# Patient Record
Sex: FEMALE | Race: WHITE
[De-identification: names, ages, dates, MRNs, and addresses within clinical notes are randomized per-mention and may not be internally consistent; named-entity substitution may affect disease eponyms.]

---

## 2017-03-18 ENCOUNTER — HOSPITAL ENCOUNTER (EMERGENCY)
Dept: HOSPITAL 47 - EC | Age: 67
Discharge: HOME | End: 2017-03-18
Payer: MEDICARE

## 2017-03-18 VITALS — HEART RATE: 77 BPM | TEMPERATURE: 98.7 F | DIASTOLIC BLOOD PRESSURE: 97 MMHG | SYSTOLIC BLOOD PRESSURE: 168 MMHG

## 2017-03-18 VITALS — RESPIRATION RATE: 18 BRPM

## 2017-03-18 DIAGNOSIS — Z91.048: ICD-10-CM

## 2017-03-18 DIAGNOSIS — F17.200: ICD-10-CM

## 2017-03-18 DIAGNOSIS — R55: Primary | ICD-10-CM

## 2017-03-18 DIAGNOSIS — Z79.899: ICD-10-CM

## 2017-03-18 DIAGNOSIS — E27.1: ICD-10-CM

## 2017-03-18 DIAGNOSIS — R22.0: ICD-10-CM

## 2017-03-18 DIAGNOSIS — T88.6XXA: ICD-10-CM

## 2017-03-18 DIAGNOSIS — E07.9: ICD-10-CM

## 2017-03-18 DIAGNOSIS — I10: ICD-10-CM

## 2017-03-18 DIAGNOSIS — T36.1X5A: ICD-10-CM

## 2017-03-18 DIAGNOSIS — R21: ICD-10-CM

## 2017-03-18 LAB
ALP SERPL-CCNC: 59 U/L (ref 38–126)
ALT SERPL-CCNC: 22 U/L (ref 9–52)
ANION GAP SERPL CALC-SCNC: 15 MMOL/L
APTT BLD: 21.1 SEC (ref 22–30)
AST SERPL-CCNC: 22 U/L (ref 14–36)
BASOPHILS # BLD AUTO: 0 K/UL (ref 0–0.2)
BASOPHILS NFR BLD AUTO: 0 %
BUN SERPL-SCNC: 27 MG/DL (ref 7–17)
CALCIUM SPEC-MCNC: 9.9 MG/DL (ref 8.4–10.2)
CH: 30.9
CHCM: 33
CHLORIDE SERPL-SCNC: 101 MMOL/L (ref 98–107)
CK SERPL-CCNC: 32 U/L (ref 30–135)
CO2 SERPL-SCNC: 22 MMOL/L (ref 22–30)
EOSINOPHIL # BLD AUTO: 0.1 K/UL (ref 0–0.7)
EOSINOPHIL NFR BLD AUTO: 1 %
ERYTHROCYTE [DISTWIDTH] IN BLOOD BY AUTOMATED COUNT: 6.04 M/UL (ref 3.8–5.4)
ERYTHROCYTE [DISTWIDTH] IN BLOOD: 13.5 % (ref 11.5–15.5)
GLUCOSE SERPL-MCNC: 226 MG/DL (ref 74–99)
HCT VFR BLD AUTO: 56.8 % (ref 34–46)
HDW: 2.39
HGB BLD-MCNC: 18.6 GM/DL (ref 11.4–16)
INR PPP: 1.1 (ref ?–1.1)
LUC NFR BLD AUTO: 3 %
LYMPHOCYTES # SPEC AUTO: 1.8 K/UL (ref 1–4.8)
LYMPHOCYTES NFR SPEC AUTO: 16 %
MAGNESIUM SPEC-SCNC: 1.8 MG/DL (ref 1.6–2.3)
MCH RBC QN AUTO: 30.9 PG (ref 25–35)
MCHC RBC AUTO-ENTMCNC: 32.8 G/DL (ref 31–37)
MCV RBC AUTO: 94.1 FL (ref 80–100)
MONOCYTES # BLD AUTO: 0.3 K/UL (ref 0–1)
MONOCYTES NFR BLD AUTO: 3 %
NEUTROPHILS # BLD AUTO: 8.6 K/UL (ref 1.3–7.7)
NEUTROPHILS NFR BLD AUTO: 78 %
NON-AFRICAN AMERICAN GFR(MDRD): 42
PHOSPHATE SERPL-MCNC: 5.3 MG/DL (ref 2.5–4.5)
POTASSIUM SERPL-SCNC: 4.2 MMOL/L (ref 3.5–5.1)
PROT SERPL-MCNC: 6.8 G/DL (ref 6.3–8.2)
PT BLD: 11.1 SEC (ref 9–12)
SODIUM SERPL-SCNC: 138 MMOL/L (ref 137–145)
TROPONIN I SERPL-MCNC: 0.01 NG/ML (ref 0–0.03)
WBC # BLD AUTO: 0.28 10*3/UL
WBC # BLD AUTO: 11.1 K/UL (ref 3.8–10.6)
WBC (PEROX): 11.19

## 2017-03-18 PROCEDURE — 82550 ASSAY OF CK (CPK): CPT

## 2017-03-18 PROCEDURE — 85379 FIBRIN DEGRADATION QUANT: CPT

## 2017-03-18 PROCEDURE — 96374 THER/PROPH/DIAG INJ IV PUSH: CPT

## 2017-03-18 PROCEDURE — 85025 COMPLETE CBC W/AUTO DIFF WBC: CPT

## 2017-03-18 PROCEDURE — 85730 THROMBOPLASTIN TIME PARTIAL: CPT

## 2017-03-18 PROCEDURE — 96375 TX/PRO/DX INJ NEW DRUG ADDON: CPT

## 2017-03-18 PROCEDURE — 83735 ASSAY OF MAGNESIUM: CPT

## 2017-03-18 PROCEDURE — 71275 CT ANGIOGRAPHY CHEST: CPT

## 2017-03-18 PROCEDURE — 96361 HYDRATE IV INFUSION ADD-ON: CPT

## 2017-03-18 PROCEDURE — 84100 ASSAY OF PHOSPHORUS: CPT

## 2017-03-18 PROCEDURE — 83605 ASSAY OF LACTIC ACID: CPT

## 2017-03-18 PROCEDURE — 80053 COMPREHEN METABOLIC PANEL: CPT

## 2017-03-18 PROCEDURE — 84484 ASSAY OF TROPONIN QUANT: CPT

## 2017-03-18 PROCEDURE — 99291 CRITICAL CARE FIRST HOUR: CPT

## 2017-03-18 PROCEDURE — 82553 CREATINE MB FRACTION: CPT

## 2017-03-18 PROCEDURE — 96372 THER/PROPH/DIAG INJ SC/IM: CPT

## 2017-03-18 PROCEDURE — 85610 PROTHROMBIN TIME: CPT

## 2017-03-18 PROCEDURE — 93005 ELECTROCARDIOGRAM TRACING: CPT

## 2017-03-18 PROCEDURE — 74177 CT ABD & PELVIS W/CONTRAST: CPT

## 2017-03-18 PROCEDURE — 36415 COLL VENOUS BLD VENIPUNCTURE: CPT

## 2017-03-18 NOTE — ED
General Adult HPI





- General


Chief complaint: Allergic Reaction


Stated complaint: UNRESPONSIVE


Time Seen by Provider: 03/18/17 16:34


Source: EMS


Mode of arrival: EMS


Limitations: no limitations





- History of Present Illness


Initial comments: 





This is a 67-year-old female who ER for evaluation today.  This patient comes 

in here for eval initial syncopal event.  Patient has no history of syncope.  

Patient states she doesn't feel like she had a urinary tract infection and 

began taking an antibiotic that was old that she prescribed or recommended for 

herself.  Patient denies any other chest pain fever nausea this time.  EMS also 

replaced over the patient was passed out when he got there.  Patient never lost 

pulse.  She does present today with itching hives and rash all over body 

anterior chest.  Denies stridor denies tongue swelling denies shortness of 

breath.  States her lips are swollen as well





- Related Data


 Home Medications











 Medication  Instructions  Recorded  Confirmed


 


Hydrocortisone [Cortef] 10 mg PO HS 03/10/15 03/18/17


 


Hydrocortisone [Cortef] 20 mg PO QAM 03/10/15 03/18/17


 


Atenolol [Tenormin] 25 mg PO QAM 03/18/17 03/18/17


 


Atorvastatin Calcium [Lipitor] 10 mg PO HS 03/18/17 03/18/17


 


Levothyroxine Sodium [Synthroid] 75 mcg PO DAILY 03/18/17 03/18/17


 


Lisinopril [Prinivil] 20 mg PO HS 03/18/17 03/18/17


 


Solu-Cortef 100 mg IM ONCE 03/18/17 03/18/17








 Previous Rx's











 Medication  Instructions  Recorded


 


Famotidine [Pepcid] 20 mg PO BID #15 tablet 03/18/17


 


Hydrocortisone [Cortef] 20 mg PO BID #15 tablet 03/18/17


 


hydrOXYzine HCL [Atarax] 25 mg PO QID PRN #30 tab 03/18/17











 Allergies











Allergy/AdvReac Type Severity Reaction Status Date / Time


 


adhesive Allergy  Rash/Hives Verified 03/18/17 16:44














Review of Systems


ROS Statement: 


Those systems with pertinent positive or pertinent negative responses have been 

documented in the HPI.





ROS Other: All systems not noted in ROS Statement are negative.





Past Medical History


Past Medical History: Hypertension, Thyroid Disorder


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


History of Any Multi-Drug Resistant Organisms: None Reported


Additional Past Surgical History / Comment(s): dental implant  thyroid


Past Psychological History: No Psychological Hx Reported


Smoking Status: Light tobacco smoker


Past Alcohol Use History: None Reported


Past Drug Use History: None Reported





General Exam





- General Exam Comments


Initial Comments: 





Rash across his entire chest and bilateral upper arms, mild lip swelling


Limitations: no limitations


General appearance: alert, in no apparent distress, anxious


Head exam: Present: atraumatic, normocephalic, normal inspection


Eye exam: Present: normal appearance, PERRL, EOMI.  Absent: scleral icterus, 

conjunctival injection, periorbital swelling


ENT exam: Present: normal exam, mucous membranes moist


Neck exam: Present: normal inspection.  Absent: tenderness, meningismus, 

lymphadenopathy


Respiratory exam: Present: normal lung sounds bilaterally.  Absent: respiratory 

distress, wheezes, rales, rhonchi, stridor


Cardiovascular Exam: Present: regular rate, normal rhythm, normal heart sounds.

  Absent: systolic murmur, diastolic murmur, rubs, gallop, clicks


GI/Abdominal exam: Present: soft, normal bowel sounds.  Absent: distended, 

tenderness, guarding, rebound, rigid


Extremities exam: Present: normal inspection, full ROM, normal capillary 

refill.  Absent: tenderness, pedal edema, joint swelling, calf tenderness


Back exam: Present: normal inspection


Neurological exam: Present: alert, oriented X3, CN II-XII intact


Psychiatric exam: Present: normal affect, normal mood


Skin exam: Present: warm, dry, intact, normal color.  Absent: rash





Course


 Vital Signs











  03/18/17 03/18/17 03/18/17





  16:33 16:58 17:09


 


Temperature 97.5 F L  


 


Pulse Rate 82 68 82


 


Respiratory 18 18 18





Rate   


 


Blood Pressure  80/57 128/63


 


O2 Sat by Pulse 100 100 100





Oximetry   














  03/18/17 03/18/17 03/18/17





  17:19 18:49 19:52


 


Temperature   98.7 F


 


Pulse Rate  71 77


 


Respiratory 18 18 18





Rate   


 


Blood Pressure  134/67 168/97


 


O2 Sat by Pulse  100 100





Oximetry   














- Reevaluation(s)


Reevaluation #1: 





03/18/17 20:06


Patient's symptoms improved rapidly with injection of epinephrine and steroids 

here in the emergency room.


Reevaluation #2: 





03/18/17 20:06


Patient and family spoken with greater than 15 minutes regarding medication 

taking dosages and ALLERGIC reaction, all questions are answered





EKG Findings





- EKG Comments:


EKG Findings:: EKG shows normal sinus rhythm rate of 80, , QRS 84, 





Medical Decision Making





- Medical Decision Making





67-year-old here for evaluation of syncopal event, patient had ALLERGIC 

reaction syncope diffuse body rash lip swelling after taking an antibiotic 

earlier in the day, Keflex.  Patient advised never to take any cephalosporin 

again, she does agreeable and understands.  Patient given epinephrine and 

steroids in the emergency room, increased steroid dose at home twice a day.  

Secondary to history of [place]'s disease.  Currently patient has no complaints

, rash and symptoms have resolved, she feels well and can be discharged home.





- Lab Data


Result diagrams: 


 03/18/17 16:54





 03/18/17 16:54


 Lab Results











  03/18/17 03/18/17 03/18/17 Range/Units





  16:54 16:54 16:54 


 


WBC   11.1 H   (3.8-10.6)  k/uL


 


RBC   6.04 H   (3.80-5.40)  m/uL


 


Hgb   18.6 H   (11.4-16.0)  gm/dL


 


Hct   56.8 H   (34.0-46.0)  %


 


MCV   94.1   (80.0-100.0)  fL


 


MCH   30.9   (25.0-35.0)  pg


 


MCHC   32.8   (31.0-37.0)  g/dL


 


RDW   13.5   (11.5-15.5)  %


 


Plt Count   238   (150-450)  k/uL


 


Neutrophils %   78   %


 


Lymphocytes %   16   %


 


Monocytes %   3   %


 


Eosinophils %   1   %


 


Basophils %   0   %


 


Neutrophils #   8.6 H   (1.3-7.7)  k/uL


 


Lymphocytes #   1.8   (1.0-4.8)  k/uL


 


Monocytes #   0.3   (0-1.0)  k/uL


 


Eosinophils #   0.1   (0-0.7)  k/uL


 


Basophils #   0.0   (0-0.2)  k/uL


 


PT     (9.0-12.0)  sec


 


INR     (<1.1)  


 


APTT     (22.0-30.0)  sec


 


D-Dimer     (<0.60)  mg/L FEU


 


Sodium    138  (137-145)  mmol/L


 


Potassium    4.2  (3.5-5.1)  mmol/L


 


Chloride    101  ()  mmol/L


 


Carbon Dioxide    22  (22-30)  mmol/L


 


Anion Gap    15  mmol/L


 


BUN    27 H  (7-17)  mg/dL


 


Creatinine    1.28 H  (0.52-1.04)  mg/dL


 


Est GFR (MDRD) Af Amer    51  (>60 ml/min/1.73 sqM)  


 


Est GFR (MDRD) Non-Af    42  (>60 ml/min/1.73 sqM)  


 


Glucose    226 H  (74-99)  mg/dL


 


Plasma Lactic Acid Sylvester     (0.7-2.0)  mmol/L


 


Calcium    9.9  (8.4-10.2)  mg/dL


 


Phosphorus    5.3 H  (2.5-4.5)  mg/dL


 


Magnesium    1.8  (1.6-2.3)  mg/dL


 


Total Bilirubin    0.8  (0.2-1.3)  mg/dL


 


AST    22  (14-36)  U/L


 


ALT    22  (9-52)  U/L


 


Alkaline Phosphatase    59  ()  U/L


 


Total Creatine Kinase  32    ()  U/L


 


CK-MB (CK-2)  0.6    (0.0-2.4)  ng/mL


 


CK-MB (CK-2) Rel Index  1.9    


 


Troponin I  0.014    (0.000-0.034)  ng/mL


 


Total Protein    6.8  (6.3-8.2)  g/dL


 


Albumin    3.8  (3.5-5.0)  g/dL














  03/18/17 03/18/17 Range/Units





  16:54 16:54 


 


WBC    (3.8-10.6)  k/uL


 


RBC    (3.80-5.40)  m/uL


 


Hgb    (11.4-16.0)  gm/dL


 


Hct    (34.0-46.0)  %


 


MCV    (80.0-100.0)  fL


 


MCH    (25.0-35.0)  pg


 


MCHC    (31.0-37.0)  g/dL


 


RDW    (11.5-15.5)  %


 


Plt Count    (150-450)  k/uL


 


Neutrophils %    %


 


Lymphocytes %    %


 


Monocytes %    %


 


Eosinophils %    %


 


Basophils %    %


 


Neutrophils #    (1.3-7.7)  k/uL


 


Lymphocytes #    (1.0-4.8)  k/uL


 


Monocytes #    (0-1.0)  k/uL


 


Eosinophils #    (0-0.7)  k/uL


 


Basophils #    (0-0.2)  k/uL


 


PT  11.1   (9.0-12.0)  sec


 


INR  1.1   (<1.1)  


 


APTT  21.1 L   (22.0-30.0)  sec


 


D-Dimer  11.71 H   (<0.60)  mg/L FEU


 


Sodium    (137-145)  mmol/L


 


Potassium    (3.5-5.1)  mmol/L


 


Chloride    ()  mmol/L


 


Carbon Dioxide    (22-30)  mmol/L


 


Anion Gap    mmol/L


 


BUN    (7-17)  mg/dL


 


Creatinine    (0.52-1.04)  mg/dL


 


Est GFR (MDRD) Af Amer    (>60 ml/min/1.73 sqM)  


 


Est GFR (MDRD) Non-Af    (>60 ml/min/1.73 sqM)  


 


Glucose    (74-99)  mg/dL


 


Plasma Lactic Acid Sylvester   3.8 H*  (0.7-2.0)  mmol/L


 


Calcium    (8.4-10.2)  mg/dL


 


Phosphorus    (2.5-4.5)  mg/dL


 


Magnesium    (1.6-2.3)  mg/dL


 


Total Bilirubin    (0.2-1.3)  mg/dL


 


AST    (14-36)  U/L


 


ALT    (9-52)  U/L


 


Alkaline Phosphatase    ()  U/L


 


Total Creatine Kinase    ()  U/L


 


CK-MB (CK-2)    (0.0-2.4)  ng/mL


 


CK-MB (CK-2) Rel Index    


 


Troponin I    (0.000-0.034)  ng/mL


 


Total Protein    (6.3-8.2)  g/dL


 


Albumin    (3.5-5.0)  g/dL














- Radiology Data


Radiology results: report reviewed (CTA chest CT and and pelvis negative for 

acute disease), image reviewed





Critical Care Time


Critical Care Time: Yes


Total Critical Care Time: 31





Disposition


Clinical Impression: 


 Adverse reaction to drug, Allergic reaction, Anaphylaxis, Syncope





Disposition: HOME SELF-CARE


Condition: Good


Instructions:  Anaphylaxis (ED)


Prescriptions: 


Famotidine [Pepcid] 20 mg PO BID #15 tablet


Hydrocortisone [Cortef] 20 mg PO BID #15 tablet


hydrOXYzine HCL [Atarax] 25 mg PO QID PRN #30 tab


 PRN Reason: Allergy Symptoms


Referrals: 


Nestor Morales MD [Primary Care Provider] - 1-2 days

## 2017-03-18 NOTE — CT
EXAMINATION TYPE: CT abdomen pelvis w con

 

DATE OF EXAM: 3/18/2017 6:34 PM

 

COMPARISON: NONE

 

HISTORY: Lower back pain.

 

CT DLP: 1489.9 mGycm

Automated exposure control for dose reduction was used.

 

TECHNIQUE:  Helical acquisition of images was performed from the lung bases through the pelvis.

 

CONTRAST: 

Performed without Oral Contrast and with IV Contrast, patient injected with 50 mL of Visipaque 320.

 

FINDINGS: 

 

LUNG BASES: No significant abnormality is appreciated.

 

LIVER/GB: Liver shows low attenuation possibly due to fatty infiltration. Gallbladder shows no wall i
rregularity anteriorly with possible associated calcification which is indeterminate, there may be an
 adherent stone present

 

PANCREAS: No significant abnormality is seen.

 

SPLEEN: No significant abnormality is seen.

 

ADRENALS: Not well seen

 

KIDNEYS: Hyperdense lesion associated with the upper pole of the left kidney may represent a hyperden
se cyst but is indeterminate, bilateral cortical cysts are present within the kidneys, no hydronephro
sis

 

RETROPERITONEAL ADENOPATHY:  None visualized

 

REPRODUCTIVE ORGANS: Uterus and adnexal structures are not seen

 

URINARY BLADDER:  No significant abnormality is seen.

 

PELVIC ADENOPATHY:  None visualized.

 

OSSEOUS STRUCTURES:  No significant abnormality is seen.

 

BOWEL:  There are small bowel loops which are fluid-filled and show wall thickening throughout much o
f the abdomen, some associated free fluid is present within the pelvis. Colonic wall thickening may b
e present as well. The appendix is not visualized with certainty.

 

OTHER:

 

IMPRESSION: 

CORRELATE FOR ENTERITIS, COLITIS. POSSIBLE PROTEINACEOUS CYST IN THE LEFT KIDNEY, FOLLOW-UP IS RECOMM
ENDED.

## 2017-03-18 NOTE — CT
EXAMINATION TYPE: CT angio chest

 

DATE OF EXAM: 3/18/2017 6:34 PM

 

COMPARISON: Chest x-ray 10 March 2015

 

HISTORY: Elevated D-Dimer and syncope today.

 

CT DLP: 1489.9 mGycm

Automated exposure control for dose reduction was used.

 

CONTRAST: 

CTA scan of the thorax is performed with IV Contrast, patient injected with 50 mL of Visipaque 320, p
ulmonary embolism protocol.  MIP images are created and reviewed.  3D reconstructed images are create
d on an independent workstation and reviewed.

 

FINDINGS:

 

LUNGS: The lungs are grossly clear, there is no concerning parenchymal mass or nodule identified.   T
here is no pleural effusion or pneumothorax seen.  The tracheobronchial tree is patent.

 

AORTA:  No additional significant abnormality is seen.

 

MEDIASTINUM: There is satisfactory enhancement of the pulmonary artery and its branches, there is no 
CT evidence for pulmonary embolism.  There are no greater than 1 cm hilar or mediastinal lymph nodes.
  There are some calcified mediastinal nodes, small prevascular nodes No pericardial effusion is seen
. 

 

 

OTHER:  There is a hyperdense mass associated with the posterior left kidney measuring approximately 
19 mm which is indeterminate.

 

 

 

IMPRESSION: 

NO PULMONARY EMBOLISM. 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 INDETERMINATE LEFT RENAL MASS, FOLLOW-UP I
S RECOMMENDED

## 2018-10-06 ENCOUNTER — HOSPITAL ENCOUNTER (EMERGENCY)
Dept: HOSPITAL 47 - EC | Age: 68
Discharge: HOME | End: 2018-10-06
Payer: MEDICARE

## 2018-10-06 VITALS
DIASTOLIC BLOOD PRESSURE: 76 MMHG | SYSTOLIC BLOOD PRESSURE: 167 MMHG | HEART RATE: 75 BPM | RESPIRATION RATE: 16 BRPM | TEMPERATURE: 97 F

## 2018-10-06 DIAGNOSIS — I10: Primary | ICD-10-CM

## 2018-10-06 DIAGNOSIS — Z91.048: ICD-10-CM

## 2018-10-06 DIAGNOSIS — Z79.899: ICD-10-CM

## 2018-10-06 DIAGNOSIS — F17.200: ICD-10-CM

## 2018-10-06 DIAGNOSIS — R51: ICD-10-CM

## 2018-10-06 PROCEDURE — 99284 EMERGENCY DEPT VISIT MOD MDM: CPT

## 2018-10-06 PROCEDURE — 96374 THER/PROPH/DIAG INJ IV PUSH: CPT

## 2018-10-06 PROCEDURE — 85652 RBC SED RATE AUTOMATED: CPT

## 2018-10-06 PROCEDURE — 96361 HYDRATE IV INFUSION ADD-ON: CPT

## 2018-10-06 PROCEDURE — 36415 COLL VENOUS BLD VENIPUNCTURE: CPT

## 2018-10-06 PROCEDURE — 70450 CT HEAD/BRAIN W/O DYE: CPT

## 2018-10-06 NOTE — ED
General Adult HPI





- General


Chief complaint: Recheck/Abnormal Lab/Rx


Stated complaint: ha


Time Seen by Provider: 10/06/18 10:53


Source: patient, RN notes reviewed


Mode of arrival: ambulatory


Limitations: no limitations





- History of Present Illness


Initial comments: 





Patient is a pleasant 68-year-old female presenting to the emergency Department 

with headache.  Headache has been waxing and waning over the past week.  

Headache was not sudden onset.  Headache generally is not severe however more 

bothersome.  Discomfort is diffuse.  Patient did have some blurry vision 

earlier.  No photophobia.  No nausea vomiting.  No history of chronic 

headaches.  Blood pressure has been running high.  Patient did take an extra 

clonidine this morning.  Blood pressure has been as high as 192/114.





- Related Data


 Home Medications











 Medication  Instructions  Recorded  Confirmed


 


Hydrocortisone [Cortef] 10 mg PO HS 03/10/15 03/18/17


 


Hydrocortisone [Cortef] 20 mg PO QAM 03/10/15 03/18/17


 


Atenolol [Tenormin] 25 mg PO QAM 03/18/17 03/18/17


 


Atorvastatin Calcium [Lipitor] 10 mg PO HS 03/18/17 03/18/17


 


Levothyroxine Sodium [Synthroid] 75 mcg PO DAILY 03/18/17 03/18/17


 


Lisinopril [Prinivil] 20 mg PO HS 03/18/17 03/18/17


 


Solu-Cortef 100 mg IM ONCE 03/18/17 03/18/17








 Previous Rx's











 Medication  Instructions  Recorded


 


Famotidine [Pepcid] 20 mg PO BID #15 tablet 03/18/17


 


Hydrocortisone [Cortef] 20 mg PO BID #15 tablet 03/18/17


 


hydrOXYzine HCL [Atarax] 25 mg PO QID PRN #30 tab 03/18/17











 Allergies











Allergy/AdvReac Type Severity Reaction Status Date / Time


 


adhesive Allergy  Rash/Hives Verified 10/06/18 10:35














Review of Systems


ROS Statement: 


Those systems with pertinent positive or pertinent negative responses have been 

documented in the HPI.





ROS Other: All systems not noted in ROS Statement are negative.


Constitutional: Denies: fever


Eyes: Denies: eye pain


ENT: Denies: ear pain


Respiratory: Denies: cough


Cardiovascular: Denies: chest pain


Endocrine: Denies: fatigue


Gastrointestinal: Denies: abdominal pain, nausea, vomiting


Genitourinary: Denies: dysuria


Musculoskeletal: Denies: back pain


Skin: Denies: rash


Neurological: Reports: headache.  Denies: weakness, confusion





Past Medical History


Past Medical History: Hypertension, Thyroid Disorder


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


History of Any Multi-Drug Resistant Organisms: None Reported


Additional Past Surgical History / Comment(s): dental implant  thyroid


Past Psychological History: No Psychological Hx Reported


Smoking Status: Light tobacco smoker


Past Alcohol Use History: None Reported


Past Drug Use History: None Reported





General Exam


Limitations: no limitations


General appearance: alert, in no apparent distress


Head exam: Present: atraumatic, normocephalic, other (No tenderness over the 

temporal artery.)


Eye exam: Present: normal appearance, PERRL, EOMI.  Absent: nystagmus


ENT exam: Present: normal oropharynx


Neck exam: Present: normal inspection.  Absent: tenderness, meningismus


Respiratory exam: Present: normal lung sounds bilaterally


Cardiovascular Exam: Present: regular rate, normal rhythm


GI/Abdominal exam: Present: soft.  Absent: tenderness


Extremities exam: Present: normal inspection


Neurological exam: Present: alert, CN II-XII intact.  Absent: motor sensory 

deficit


  ** Expanded


Neurological exam: Present: protecting the airway


Speech: Present: fluid speech


Cranial nerves: EOM's Intact: Normal, Facial Sensation: Normal


Cerebellar function: Finger to Nose: Normal


Sensory exam: Upper Extremity Light Touch: Normal, Lower Extremity Light Touch: 

Normal


Motor strength exam: RUE: 5, LUE: 5, RLE: 5, LLE: 5


Eye Response: (4) open spontaneously


Motor Response: (6) obeys commands


Verbal Response: (5) oriented


Psychiatric exam: Present: normal affect, normal mood


Skin exam: Present: normal color





Course


 Vital Signs











  10/06/18 10/06/18





  10:34 12:59


 


Temperature 98.0 F 


 


Pulse Rate 50 L 


 


Respiratory 20 





Rate  


 


Blood Pressure 144/85 178/79


 


O2 Sat by Pulse 96 





Oximetry  














Medical Decision Making





- Medical Decision Making





Patient reevaluated and resting comfortably in bed.  Patient states headache 

has resolved.  Patient updated on results and need for follow-up.





- Lab Data


 Lab Results











  10/06/18 Range/Units





  11:30 


 


ESR  13  (0-20)  mm/hr














- Radiology Data


Radiology results: image reviewed (Computed tomography scan of the brain 

reveals no acute intercranial abnormality.  Old lacunar infarct.)





Disposition


Clinical Impression: 


 Headache, Hypertension





Disposition: HOME SELF-CARE


Condition: Stable


Instructions:  Acute Headache (ED), Hypertension (ED)


Additional Instructions: 


Please follow-up with primary care physician in the next couple days for 

recheck.  Have primary care physician review recent blood pressures.  Return 

for uncontrolled blood pressure, increased pain, confusion or weakness, 

worsening symptoms or other concerns.


Is patient prescribed a controlled substance at d/c from ED?: No


Referrals: 


Nestor Morales MD [Primary Care Provider] - 1-2 days


Time of Disposition: 13:01

## 2018-10-06 NOTE — CT
EXAMINATION TYPE: CT brain wo con

 

DATE OF EXAM: 10/6/2018

 

COMPARISON: Previous study dated 3/10/2015.

 

HISTORY: Headache and hypertension

 

CT DLP: 1121 mGycm

Automated exposure control for dose reduction was used.

 

FINDINGS: 

There is evidence of an old lacunar infarct in the internal capsule on the right.

 

There are mild changes of sulcal prominence and ventriculomegaly compatible with atrophic change. The
re is diffuse periventricular white matter lucency compatible with chronic white matter ischemic antonio
ge. There is physiologic calcification of the basal ganglia.

 

There is no acute focal lesion, mass effect or midline shift identified. I do not see evidence of int
racranial blood.

 

Visualized portions of the paranasal sinuses and mastoids are clear. The bony calvarium is intact.

 

IMPRESSION: 

1. NO ACUTE INTRACRANIAL ABNORMALITY.

2. 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

3. MILD DEGENERATIVE CHANGE.

## 2018-12-11 ENCOUNTER — HOSPITAL ENCOUNTER (OUTPATIENT)
Dept: HOSPITAL 47 - RADCTMAIN | Age: 68
Discharge: HOME | End: 2018-12-11
Attending: INTERNAL MEDICINE
Payer: MEDICARE

## 2018-12-11 DIAGNOSIS — R51: Primary | ICD-10-CM

## 2018-12-11 DIAGNOSIS — R06.02: ICD-10-CM

## 2018-12-11 LAB — BUN SERPL-SCNC: 29 MG/DL (ref 7–17)

## 2018-12-11 PROCEDURE — 36415 COLL VENOUS BLD VENIPUNCTURE: CPT

## 2018-12-11 PROCEDURE — 84520 ASSAY OF UREA NITROGEN: CPT

## 2018-12-11 PROCEDURE — 71046 X-RAY EXAM CHEST 2 VIEWS: CPT

## 2018-12-11 PROCEDURE — 70496 CT ANGIOGRAPHY HEAD: CPT

## 2018-12-11 PROCEDURE — 82565 ASSAY OF CREATININE: CPT

## 2018-12-11 NOTE — XR
EXAMINATION TYPE: XR chest 2V

 

DATE OF EXAM: 12/11/2018

 

COMPARISON: Prior chest x-ray 3/10/2015

 

HISTORY: Shortness of breath

 

TECHNIQUE:  Frontal and lateral views of the chest are obtained.

 

FINDINGS:  There is no focal air space opacity, pleural effusion, or pneumothorax seen.  The cardiac 
silhouette size is within normal limits.  There may be a spinal curvature. There is eventration of th
e hemidiaphragms. Prominent lung lines are noted. The osseous structures are intact.

 

IMPRESSION:  No acute cardiopulmonary process.

## 2018-12-11 NOTE — CT
EXAMINATION TYPE: CT angio head

 

DATE OF EXAM: 12/11/2018 12:38 PM

 

COMPARISON: None

 

HISTORY: Head throbbing while laying down

 

CT DLP: 980.7 mGycm

Automated exposure control for dose reduction was used.

 

TECHNIQUE: 

Performed with IV Contrast, patient injected with 80 mL of Isovue 370.

Three-D reconstructed images performed on the e[employer] computer by the technologist are filmed and pres
ented.. 

 

FINDINGS: 

Internal carotid arteries bifurcate normally into A1 and M1 segments. A2 segments are normal. The ant
erior communicating artery is patent.

 

No posterior communicating arteries are identified.

 

Vertebral basilar system appears normal. Posterior cerebral vasculature appears normal.

 

No suspicious arterial venous malformations are evident. No aneurysm is identified. Source images are
 reviewed.

 

Images through the brain as visualized appear grossly normal.

 

IMPRESSION: 

CTA [place] APPEARS UNREMARKABLE.

## 2018-12-20 ENCOUNTER — HOSPITAL ENCOUNTER (OUTPATIENT)
Dept: HOSPITAL 47 - RADBDWWP | Age: 68
Discharge: HOME | End: 2018-12-20
Attending: INTERNAL MEDICINE
Payer: MEDICARE

## 2018-12-20 DIAGNOSIS — Z79.51: ICD-10-CM

## 2018-12-20 DIAGNOSIS — M85.88: Primary | ICD-10-CM

## 2018-12-20 LAB — T4 FREE SERPL-MCNC: 1.47 NG/DL (ref 0.78–2.19)

## 2018-12-20 PROCEDURE — 84443 ASSAY THYROID STIM HORMONE: CPT

## 2018-12-20 PROCEDURE — 77080 DXA BONE DENSITY AXIAL: CPT

## 2018-12-20 PROCEDURE — 36415 COLL VENOUS BLD VENIPUNCTURE: CPT

## 2018-12-20 PROCEDURE — 84439 ASSAY OF FREE THYROXINE: CPT

## 2018-12-20 NOTE — BD
EXAMINATION TYPE: Bone Density

 

DATE OF EXAM: 12/20/2018

 

CLINICAL HISTORY: 

 

Height:  63.5

Weight:  139

 

FRAX RISK QUESTIONS:

Alcohol (3 or more units per day):  no

Family History (Parent hip fracture):  no

Glucocorticoids (More than 3mos):  yes

           (Ex: prednisone, prednisolone, methylprednisolone, dexamethasone, and hydrocortisone).    
     

History of Fracture in Adulthood: no

Secondary Osteoporosis:

  1.  Type 1 Diabetes: no

  2.  Hyperthyroidism: previously yes

  3.  Menopause before 45: yes

  4.  Malnutrition: no

  5.  Chronic liver disease: no

Rheumatoid Arthritis: no

Current Tobacco Use: no

 

RISK FACTORS 

HISTORY OF: 

Family History of Osteoporosis: not that patient is aware

Active: yes

Diet low in dairy products/other sources of calcium:  no

Postmenopausal woman: yes

Take estrogen and/or progesterone medications: no now

How long: about 20 years

Lost more than 2 inches in height since high school: no

Frequent falls: no

Poor Health: "pretty good considering current health concerns"

Hyperparathyroidism: no

Adrenal Insufficiency: Addisons

 

MEDICATIONS: 

Prednisone or other steroids: yes

How Long: about 30 years

Thyroid Medications:  yes

Which medication: Synthroid

How Long: about 8 years

Osteoporosis Medications: not now

Which medication:  Actonel

How Long: briefly

Additional Medications: blood pressure meds, cholesterol meds 

 

 

Additional History: Dave's disease since age 22; oblated thyroid with radioactive treatment years 
ago

 

 

EXAM MEASUREMENTS: 

Bone mineral densitometry was performed using the bubl System.

Bone mineral density as measured about the Lumbar spine is:  

----- L1-L4(G/cm2): 0.996

T Score Values are as follows:

----- L2: -2-2

----- L3: -1.2

----- L4: -1.2

----- L1-L4: -1.5

Bone mineral density has: Decreased -3.2% since study of: 07/29/2005

 

Bone mineral density about the R hip (g/cm2): 0.742

Bone mineral density about the L hip (g/cm2): 0.742

T Score values are as follows:

-----R Neck: -2.1

-----L Neck: -2.1

-----R Total: -1.6

-----L Total: -1.8

Bone mineral density has: Decreased -4.0% since study of: 07/29/2005

 

 

 

IMPRESSION:

Osteopenia

 

 

 

 

 

NOTE:  T-SCORE=SD OF THE YOUNG ADULT MEAN.

## 2020-10-02 ENCOUNTER — HOSPITAL ENCOUNTER (OUTPATIENT)
Dept: HOSPITAL 47 - RADUSWWP | Age: 70
Discharge: HOME | End: 2020-10-02
Attending: INTERNAL MEDICINE
Payer: MEDICARE

## 2020-10-02 DIAGNOSIS — N28.1: Primary | ICD-10-CM

## 2020-10-02 DIAGNOSIS — N20.0: ICD-10-CM

## 2020-10-02 PROCEDURE — 76770 US EXAM ABDO BACK WALL COMP: CPT

## 2020-10-02 NOTE — US
EXAMINATION TYPE: US kidneys/renal and bladder

 

DATE OF EXAM: 10/2/2020

 

COMPARISON: US 4/6/2017, CT 3/18/2017

 

CLINICAL HISTORY: R31.9 Hematuria.

 

EXAM MEASUREMENTS:

 

Right Kidney:  9.4 x 4.3 x 4.3 cm

Left Kidney: 9.3 x 4.1 x 3.6 cm

 

 

Right Kidney: No hydronephrosis. Multiple cystic areas visualized, largest measuring 0.8 x 0.8 x 1.0 
cm 

Left Kidney: No hydronephrosis. Unable to visualized area seen on previous. Echogenic foci visualized
 measuring 0.3 cm    

Bladder: wnl

**Bilateral Jets seen: Yes

 

 

There is no evidence for hydronephrosis at this point in time.  The urinary bladder is anechoic.  Stevan
ateral ureteral jets are seen.

 

 

 

IMPRESSION:

Small cysts of the right kidney. Nonobstructing calculus left kidney.

## 2020-11-16 ENCOUNTER — HOSPITAL ENCOUNTER (OUTPATIENT)
Dept: HOSPITAL 47 - RADCTMAIN | Age: 70
Discharge: HOME | End: 2020-11-16
Attending: INTERNAL MEDICINE
Payer: MEDICARE

## 2020-11-16 DIAGNOSIS — N28.1: Primary | ICD-10-CM

## 2020-11-16 LAB — BUN SERPL-SCNC: 13 MG/DL (ref 7–17)

## 2020-11-16 PROCEDURE — 84520 ASSAY OF UREA NITROGEN: CPT

## 2020-11-16 PROCEDURE — 74178 CT ABD&PLV WO CNTR FLWD CNTR: CPT

## 2020-11-16 PROCEDURE — 36415 COLL VENOUS BLD VENIPUNCTURE: CPT

## 2020-11-16 PROCEDURE — 82565 ASSAY OF CREATININE: CPT

## 2020-11-16 NOTE — CT
EXAMINATION TYPE: CT abdomen pelvis wo/w con

 

DATE OF EXAM: 11/16/2020

 

COMPARISON: 3/18/2017

 

HISTORY: Back and abdominal pain x 2 weeks.  Kidney cyst.

 

CT DLP: 777.7 mGycm

 

CONTRAST: 

CT scan of the abdomen and pelvis is performed with Oral Contrast and without and with IV Contrast, p
atient injected with 100 mL of Isovue M300.

 

FINDINGS: 

LUNG BASES-: No visible nodule.  No infiltrate. 

 

LIVER/GB:   No calcified gallstones.  No space occupying hepatic lesion. Biliary tree is of normal ca
liber. 

 

PANCREAS:  No inflammation.  No distinct mass. 

 

SPLEEN:  No splenic enlargement.  No lesion seen. 

 

ADRENALS:  No nodule.  No thickening. 

 

KIDNEYS/BLADDER:  No hydronephrosis.  No nephrolithiasis. Stable exophytic lesion upper pole left kid
ty medially measuring 1.6 cm which demonstrates Hounsfield unit measurement of approximately 47 both
 before and after the administration of contrast. This likely reflects a hemorrhagic or highly protei
naceous cyst. Subcentimeter simple cysts are also noted bilaterally. Urinary bladder grossly unremark
able. 

 

BOWEL: Normal appendix.  Normal bowel caliber.  No inflammation.

 

GENITAL ORGANS:  No gross abnormality. 

 

LYMPH NODES:  No greater than 1cm abdominal or pelvic lymph nodes are appreciated.

 

AORTA: No significant abnormality. 

 

OSSEOUS STRUCTURES:  No significant abnormality is seen. 

 

OTHER:  No significant additional abnormality is seen. 

 

IMPRESSION: 

1. Stable exophytic cyst upper pole left kidney which may reflect a highly proteinaceous or hemorrhag
ic cyst. Additional small subcentimeter simple cysts appreciated.

## 2023-10-25 ENCOUNTER — HOSPITAL ENCOUNTER (OUTPATIENT)
Dept: HOSPITAL 47 - EC | Age: 73
Setting detail: OBSERVATION
LOS: 3 days | Discharge: HOME | End: 2023-10-28
Attending: HOSPITALIST | Admitting: HOSPITALIST
Payer: MEDICARE

## 2023-10-25 DIAGNOSIS — I47.19: ICD-10-CM

## 2023-10-25 DIAGNOSIS — N28.1: ICD-10-CM

## 2023-10-25 DIAGNOSIS — Z96.5: ICD-10-CM

## 2023-10-25 DIAGNOSIS — E87.6: ICD-10-CM

## 2023-10-25 DIAGNOSIS — E27.1: ICD-10-CM

## 2023-10-25 DIAGNOSIS — Z79.52: ICD-10-CM

## 2023-10-25 DIAGNOSIS — I11.9: ICD-10-CM

## 2023-10-25 DIAGNOSIS — Z79.890: ICD-10-CM

## 2023-10-25 DIAGNOSIS — E78.5: ICD-10-CM

## 2023-10-25 DIAGNOSIS — I07.1: ICD-10-CM

## 2023-10-25 DIAGNOSIS — Z91.048: ICD-10-CM

## 2023-10-25 DIAGNOSIS — E87.0: ICD-10-CM

## 2023-10-25 DIAGNOSIS — I16.0: Primary | ICD-10-CM

## 2023-10-25 DIAGNOSIS — Z79.899: ICD-10-CM

## 2023-10-25 DIAGNOSIS — E03.9: ICD-10-CM

## 2023-10-25 DIAGNOSIS — R63.8: ICD-10-CM

## 2023-10-25 LAB
ALBUMIN SERPL-MCNC: 4 G/DL (ref 3.5–5)
ALP SERPL-CCNC: 70 U/L (ref 38–126)
ALT SERPL-CCNC: 14 U/L (ref 4–34)
ANION GAP SERPL CALC-SCNC: 7 MMOL/L
AST SERPL-CCNC: 27 U/L (ref 14–36)
BASOPHILS # BLD AUTO: 0 K/UL (ref 0–0.2)
BASOPHILS NFR BLD AUTO: 1 %
BUN SERPL-SCNC: 12 MG/DL (ref 7–17)
CALCIUM SPEC-MCNC: 9.4 MG/DL (ref 8.4–10.2)
CHLORIDE SERPL-SCNC: 104 MMOL/L (ref 98–107)
CO2 SERPL-SCNC: 30 MMOL/L (ref 22–30)
EOSINOPHIL # BLD AUTO: 0.3 K/UL (ref 0–0.7)
EOSINOPHIL NFR BLD AUTO: 5 %
ERYTHROCYTE [DISTWIDTH] IN BLOOD BY AUTOMATED COUNT: 4.84 M/UL (ref 3.8–5.4)
ERYTHROCYTE [DISTWIDTH] IN BLOOD: 13.6 % (ref 11.5–15.5)
GLUCOSE SERPL-MCNC: 116 MG/DL (ref 74–99)
HCT VFR BLD AUTO: 43.1 % (ref 34–46)
HGB BLD-MCNC: 14.5 GM/DL (ref 11.4–16)
LYMPHOCYTES # SPEC AUTO: 2.2 K/UL (ref 1–4.8)
LYMPHOCYTES NFR SPEC AUTO: 34 %
MCH RBC QN AUTO: 29.9 PG (ref 25–35)
MCHC RBC AUTO-ENTMCNC: 33.6 G/DL (ref 31–37)
MCV RBC AUTO: 89.1 FL (ref 80–100)
MONOCYTES # BLD AUTO: 0.4 K/UL (ref 0–1)
MONOCYTES NFR BLD AUTO: 6 %
NEUTROPHILS # BLD AUTO: 3.5 K/UL (ref 1.3–7.7)
NEUTROPHILS NFR BLD AUTO: 53 %
PLATELET # BLD AUTO: 182 K/UL (ref 150–450)
POTASSIUM SERPL-SCNC: 3.2 MMOL/L (ref 3.5–5.1)
PROT SERPL-MCNC: 6.9 G/DL (ref 6.3–8.2)
SODIUM SERPL-SCNC: 141 MMOL/L (ref 137–145)
T4 FREE SERPL-MCNC: 1.88 NG/DL (ref 0.78–2.19)
WBC # BLD AUTO: 6.6 K/UL (ref 3.8–10.6)

## 2023-10-25 PROCEDURE — 84443 ASSAY THYROID STIM HORMONE: CPT

## 2023-10-25 PROCEDURE — 74175 CTA ABDOMEN W/CONTRAST: CPT

## 2023-10-25 PROCEDURE — 93005 ELECTROCARDIOGRAM TRACING: CPT

## 2023-10-25 PROCEDURE — 93306 TTE W/DOPPLER COMPLETE: CPT

## 2023-10-25 PROCEDURE — 85025 COMPLETE CBC W/AUTO DIFF WBC: CPT

## 2023-10-25 PROCEDURE — 71250 CT THORAX DX C-: CPT

## 2023-10-25 PROCEDURE — 84439 ASSAY OF FREE THYROXINE: CPT

## 2023-10-25 PROCEDURE — 74176 CT ABD & PELVIS W/O CONTRAST: CPT

## 2023-10-25 PROCEDURE — 83835 ASSAY OF METANEPHRINES: CPT

## 2023-10-25 PROCEDURE — 84295 ASSAY OF SERUM SODIUM: CPT

## 2023-10-25 PROCEDURE — 96374 THER/PROPH/DIAG INJ IV PUSH: CPT

## 2023-10-25 PROCEDURE — 83735 ASSAY OF MAGNESIUM: CPT

## 2023-10-25 PROCEDURE — 84244 ASSAY OF RENIN: CPT

## 2023-10-25 PROCEDURE — 82088 ASSAY OF ALDOSTERONE: CPT

## 2023-10-25 PROCEDURE — 70450 CT HEAD/BRAIN W/O DYE: CPT

## 2023-10-25 PROCEDURE — 80048 BASIC METABOLIC PNL TOTAL CA: CPT

## 2023-10-25 PROCEDURE — 99284 EMERGENCY DEPT VISIT MOD MDM: CPT

## 2023-10-25 PROCEDURE — 81001 URINALYSIS AUTO W/SCOPE: CPT

## 2023-10-25 PROCEDURE — 36415 COLL VENOUS BLD VENIPUNCTURE: CPT

## 2023-10-25 PROCEDURE — 80053 COMPREHEN METABOLIC PANEL: CPT

## 2023-10-25 RX ADMIN — HYDROCORTISONE SCH MG: 10 TABLET ORAL at 22:41

## 2023-10-25 RX ADMIN — ATORVASTATIN CALCIUM SCH MG: 40 TABLET, FILM COATED ORAL at 22:41

## 2023-10-25 RX ADMIN — ACETAMINOPHEN PRN MG: 325 TABLET, FILM COATED ORAL at 22:40

## 2023-10-25 NOTE — ED
General Adult HPI





- General


Chief complaint: Recheck/Abnormal Lab/Rx


Stated complaint: HTN


Source: patient


Mode of arrival: wheelchair


Limitations: no limitations





- History of Present Illness


Initial comments: 





73-year-old female presents to the emergency department by Dr. Breen office for

admission.  Patient has a history of Arlington's disease and uncontrolled high 

blood pressure.  She does take lisinopril twice daily as well as hydralazine 4 

times daily.  States she has been taking his medications as directed however 

continues to have intense headaches with uncontrolled high blood pressure.  She 

went into Dr. Breen office today and they sent her to the hospital for admi

ssion.  It has been too hard to control her blood pressures with oral 

medications.  Patient reports to an intense headache.  Denies any chest pain or 

shortness of breath.  No visual changes.  Denies any weakness or numbness in her

extremities.  Denies any missed doses of her medication.  No other alleviating, 

precipitating or modifying factors





- Related Data


                                Home Medications











 Medication  Instructions  Recorded  Confirmed


 


Hydrocortisone [Cortef] 10 mg PO BID 03/10/15 10/25/23


 


Levothyroxine Sodium [Synthroid] 75 mcg PO DAILY 03/18/17 10/25/23


 


lisinopriL [Prinivil] 20 mg PO BID 03/18/17 10/25/23


 


Rosuvastatin [Crestor] 20 mg PO HS 10/25/23 10/25/23


 


hydrALAZINE HCL [Apresoline] 25 mg PO QID 10/25/23 10/25/23











                                    Allergies











Allergy/AdvReac Type Severity Reaction Status Date / Time


 


adhesive Allergy  Rash/Hives Verified 10/25/23 19:25














Review of Systems


ROS Statement: 


Those systems with pertinent positive or pertinent negative responses have been 

documented in the HPI.





ROS Other: All systems not noted in ROS Statement are negative.





Past Medical History


Past Medical History: Hypertension, Thyroid Disorder


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


History of Any Multi-Drug Resistant Organisms: None Reported


Additional Past Surgical History / Comment(s): dental implant  thyroid


Past Psychological History: No Psychological Hx Reported


Past Alcohol Use History: None Reported


Past Drug Use History: None Reported





General Exam


Limitations: no limitations


General appearance: alert, in no apparent distress


Head exam: Present: atraumatic, normocephalic, normal inspection


Eye exam: Present: normal appearance, PERRL, EOMI.  Absent: scleral icterus, 

conjunctival injection, periorbital swelling


ENT exam: Present: normal exam, mucous membranes moist


Neck exam: Present: normal inspection.  Absent: tenderness, meningismus, 

lymphadenopathy


Respiratory exam: Present: normal lung sounds bilaterally.  Absent: respiratory 

distress, wheezes, rales, rhonchi, stridor


Cardiovascular Exam: Present: regular rate, normal rhythm, normal heart sounds. 

 Absent: systolic murmur, diastolic murmur, rubs, gallop, clicks


GI/Abdominal exam: Present: soft, normal bowel sounds.  Absent: distended, 

tenderness, guarding, rebound, rigid


Extremities exam: Present: normal inspection, full ROM, normal capillary refill.

  Absent: tenderness, pedal edema, joint swelling, calf tenderness


Back exam: Present: normal inspection


Neurological exam: Present: alert, oriented X3, CN II-XII intact


Psychiatric exam: Present: normal affect, normal mood


Skin exam: Present: warm, dry, intact, normal color.  Absent: rash





Course


                                   Vital Signs











  10/25/23 10/25/23 10/25/23





  16:42 17:30 18:00


 


Temperature 98.3 F  


 


Pulse Rate 60 57 L 62


 


Respiratory 15 18 18





Rate   


 


Blood Pressure 220/95 191/85 205/88


 


O2 Sat by Pulse 97 99 99





Oximetry   














  10/25/23 10/25/23 10/25/23





  18:30 19:00 19:30


 


Temperature   


 


Pulse Rate 61 69 72


 


Respiratory 18 18 17





Rate   


 


Blood Pressure 228/103 154/72 132/68


 


O2 Sat by Pulse 99 99 98





Oximetry   














  10/25/23 10/25/23 10/25/23





  20:00 20:30 21:00


 


Temperature   


 


Pulse Rate 67 68 64


 


Respiratory 18 17 18





Rate   


 


Blood Pressure 140/72 138/72 151/75


 


O2 Sat by Pulse 98 99 99





Oximetry   














  10/25/23





  21:30


 


Temperature 


 


Pulse Rate 62


 


Respiratory 18





Rate 


 


Blood Pressure 136/71


 


O2 Sat by Pulse 98





Oximetry 














Medical Decision Making





- Medical Decision Making


Was pt. sent in by a medical professional or institution (, PA, NP, urgent 

care, hospital, or nursing home...) When possible be specific


@  -Dr. Breen office


Did you speak to anyone other than the patient for history (EMS, parent, family,

 police, friend...)? What history was obtained from this source 


@  -Dr. Morales


Did you review nursing and triage notes (agree or disagree)?  Why? 


@  -I reviewed and agree with nursing and triage notes


Were old charts reviewed (outside hosp., previous admission, EMS record, old 

EKG, old radiological studies, urgent care reports/EKG's, nursing home records)?

 Report findings 


@  -No old charts were reviewed


Differential Diagnosis (chest pain, altered mental status, abdominal pain women,

 abdominal pain men, vaginal bleeding, weakness, fever, dyspnea, syncope, 

headache, dizziness, GI bleed, back pain, seizure, CVA, palpatations, mental 

health, musculoskeletal)? 


@  -Differential Headache:


Migraine, tension, cluster, carbon monoxide, central venous thrombosis, pension 

karma temporal arteritis, acute closure glaucoma, intercranial hemorrhage, 

mastoiditis, sinusitis, head injury, this is not meant to be an all-inclusive 

list.


EKG interpreted by me (3pts min.).


@  -Yes and demonstrates sinus rhythm with rate of 61.  MN interval 130.  QRS 

90.  QTC of 448.  No acute ST segment elevations or depressions


X-rays interpreted by me (1pt min.).


@  -None done


CT interpreted by me (1pt min.).


@  -None done


U/S interpreted by me (1pt. min.).


@  -None done


What testing was considered but not performed or refused? (CT, X-rays, U/S, 

labs)? Why?


@  -None


What meds were considered but not given or refused? Why?


@  -None


Did you discuss the management of the patient with other professionals 

(professionals i.e. , PA, NP, lab, RT, psych nurse, , , 

teacher, , )? Give summary


@  -Spoke with Molly from [facility]


Was smoking cessation discussed for >3mins.?


@  -No


Was critical care preformed (if so, how long)?


@  -No


Were there social determinants of health that impacted care today? How? 

(Homelessness, low income, unemployed, alcoholism, drug addiction, transpo

rtation, low edu. Level, literacy, decrease access to med. care, senior living, rehab)?


@  -No


Was there de-escalation of care discussed even if they declined (Discuss DNR or 

withdrawal of care, Hospice)? DNR status


@  -No


What co-morbidities impacted this encounter? (DM, HTN, Smoking, COPD, CAD, 

Cancer, CVA, ARF, Chemo, Hep., AIDS, mental health diagnosis, sleep apnea, 

morbid obesity)?


@  -Ar[place]'s disease, hypertension


Was patient admitted / discharged? Hospital course, mention meds given and 

route, prescriptions, significant lab abnormalities, going to OR and other 

pertinent info.


@  -Arrival patient was placed in room 16.  There are history of physical exam 

was performed.  Patient markedly hypertensive.  Laboratory studies are conducted

 and reviewed.  I did give the patient 20 of hydralazine which she does have 

improvement in her blood pressure.  I discussed the diagnosis, differential and 

treatment options.  Patient will be admitted as it is proven her home m

edications are not controlling her blood pressure.  Patient requires nephrology 

consultation.  Spoke with Molly from [facility] who agreed to admit patient


Undiagnosed new problem with uncertain prognosis?


@  -yes


Drug Therapy requiring intensive monitoring for toxicity (Heparin, Nitro, 

Insulin, Cardizem)?


@  -No


Were any procedures done?


@  -No


Diagnosis/symptom?


@  -Accelerated hypertension, history of Dave's disease, acute cephalgia


Acute, or Chronic, or Acute on Chronic?


@  -Acute on chronic


Uncomplicated (without systemic symptoms) or Complicated (systemic symptoms)?


@  -Complicated


Side effects of treatment?


@  -Hypotension


Exacerbation, Progression, or Severe Exacerbation?


@  -Yes


Poses a threat to life or bodily function? How? (Chest pain, USA, MI, pneumonia,

 PE, COPD, DKA, ARF, appy, cholecystitis, CVA, Diverticulitis, Homicidal, 

Suicidal, threat to staff... and all critical care pts)


@  -No





- Lab Data


Result diagrams: 


                                 10/25/23 17:17





                                 10/25/23 17:17


                                   Lab Results











  10/25/23 10/25/23 Range/Units





  17:17 17:17 


 


WBC  6.6   (3.8-10.6)  k/uL


 


RBC  4.84   (3.80-5.40)  m/uL


 


Hgb  14.5   (11.4-16.0)  gm/dL


 


Hct  43.1   (34.0-46.0)  %


 


MCV  89.1   (80.0-100.0)  fL


 


MCH  29.9   (25.0-35.0)  pg


 


MCHC  33.6   (31.0-37.0)  g/dL


 


RDW  13.6   (11.5-15.5)  %


 


Plt Count  182   (150-450)  k/uL


 


MPV  11.7   


 


Neutrophils %  53   %


 


Lymphocytes %  34   %


 


Monocytes %  6   %


 


Eosinophils %  5   %


 


Basophils %  1   %


 


Neutrophils #  3.5   (1.3-7.7)  k/uL


 


Lymphocytes #  2.2   (1.0-4.8)  k/uL


 


Monocytes #  0.4   (0-1.0)  k/uL


 


Eosinophils #  0.3   (0-0.7)  k/uL


 


Basophils #  0.0   (0-0.2)  k/uL


 


Sodium   141  (137-145)  mmol/L


 


Potassium   3.2 L  (3.5-5.1)  mmol/L


 


Chloride   104  ()  mmol/L


 


Carbon Dioxide   30  (22-30)  mmol/L


 


Anion Gap   7  mmol/L


 


BUN   12  (7-17)  mg/dL


 


Creatinine   0.82  (0.52-1.04)  mg/dL


 


Est GFR (CKD-EPI)AfAm   83  (>60 ml/min/1.73 sqM)  


 


Est GFR (CKD-EPI)NonAf   72  (>60 ml/min/1.73 sqM)  


 


Glucose   116 H  (74-99)  mg/dL


 


Calcium   9.4  (8.4-10.2)  mg/dL


 


Total Bilirubin   0.4  (0.2-1.3)  mg/dL


 


AST   27  (14-36)  U/L


 


ALT   14  (4-34)  U/L


 


Alkaline Phosphatase   70  ()  U/L


 


Total Protein   6.9  (6.3-8.2)  g/dL


 


Albumin   4.0  (3.5-5.0)  g/dL


 


TSH   0.077 L  (0.465-4.680)  mIU/L


 


Free T4   1.88  (0.78-2.19)  ng/dL














Disposition


Clinical Impression: 


 Accelerated hypertension, Addisons disease





Disposition: ADMITTED AS IP TO THIS [facility]


Condition: Stable


Is patient prescribed a controlled substance at d/c from ED?: No


Time of Disposition: 21:01


Decision to Admit Reason: Admit from EC


Decision Date: 10/25/23


Decision Time: 21:01

## 2023-10-26 LAB
ANION GAP SERPL CALC-SCNC: 8 MMOL/L
BASOPHILS # BLD AUTO: 0 K/UL (ref 0–0.2)
BASOPHILS NFR BLD AUTO: 1 %
BUN SERPL-SCNC: 15 MG/DL (ref 7–17)
CALCIUM SPEC-MCNC: 9.6 MG/DL (ref 8.4–10.2)
CHLORIDE SERPL-SCNC: 102 MMOL/L (ref 98–107)
CO2 SERPL-SCNC: 29 MMOL/L (ref 22–30)
EOSINOPHIL # BLD AUTO: 0.3 K/UL (ref 0–0.7)
EOSINOPHIL NFR BLD AUTO: 6 %
ERYTHROCYTE [DISTWIDTH] IN BLOOD BY AUTOMATED COUNT: 4.78 M/UL (ref 3.8–5.4)
ERYTHROCYTE [DISTWIDTH] IN BLOOD: 13.6 % (ref 11.5–15.5)
GLUCOSE SERPL-MCNC: 101 MG/DL (ref 74–99)
HCT VFR BLD AUTO: 43.1 % (ref 34–46)
HGB BLD-MCNC: 14.2 GM/DL (ref 11.4–16)
LYMPHOCYTES # SPEC AUTO: 2.3 K/UL (ref 1–4.8)
LYMPHOCYTES NFR SPEC AUTO: 39 %
MCH RBC QN AUTO: 29.8 PG (ref 25–35)
MCHC RBC AUTO-ENTMCNC: 33 G/DL (ref 31–37)
MCV RBC AUTO: 90.2 FL (ref 80–100)
MONOCYTES # BLD AUTO: 0.4 K/UL (ref 0–1)
MONOCYTES NFR BLD AUTO: 6 %
NEUTROPHILS # BLD AUTO: 2.8 K/UL (ref 1.3–7.7)
NEUTROPHILS NFR BLD AUTO: 48 %
PLATELET # BLD AUTO: 211 K/UL (ref 150–450)
POTASSIUM SERPL-SCNC: 3.4 MMOL/L (ref 3.5–5.1)
SODIUM SERPL-SCNC: 139 MMOL/L (ref 137–145)
WBC # BLD AUTO: 6 K/UL (ref 3.8–10.6)

## 2023-10-26 RX ADMIN — IOPAMIDOL PRN ML: 612 INJECTION, SOLUTION INTRAVENOUS at 12:55

## 2023-10-26 RX ADMIN — POTASSIUM CHLORIDE SCH MEQ: 20 TABLET, EXTENDED RELEASE ORAL at 09:20

## 2023-10-26 RX ADMIN — POTASSIUM CHLORIDE SCH MEQ: 20 TABLET, EXTENDED RELEASE ORAL at 11:29

## 2023-10-26 RX ADMIN — HYDROCORTISONE SCH MG: 10 TABLET ORAL at 22:03

## 2023-10-26 RX ADMIN — POTASSIUM CHLORIDE SCH MEQ: 20 TABLET, EXTENDED RELEASE ORAL at 10:11

## 2023-10-26 RX ADMIN — ATORVASTATIN CALCIUM SCH MG: 40 TABLET, FILM COATED ORAL at 22:03

## 2023-10-26 RX ADMIN — HYDROCORTISONE SCH MG: 10 TABLET ORAL at 09:20

## 2023-10-26 RX ADMIN — ACETAMINOPHEN PRN MG: 325 TABLET, FILM COATED ORAL at 09:23

## 2023-10-26 RX ADMIN — IOPAMIDOL PRN ML: 612 INJECTION, SOLUTION INTRAVENOUS at 11:53

## 2023-10-26 NOTE — P.NPCON
History of Present Illness





- Reason for Consult


accelerated hypertension





- History of Present Illness





Patient is a 73-year-old female with history of adrenal insufficiency diagnosed 

about 45 years ago and currently maintained on Cortef.  Patient also has a 

history of hypertension which had been previously very well controlled on one 

medication.


Patient is admitted to the hospital with history of uncontrolled hypertension 

for about 2-3 weeks prior to admission.  Hydralazine was recently added but 

blood pressure remained significantly elevated and therefore patient was advised

admission.





Patient denies any change in her diet.


No history of use of NSAIDs


No reported increase in external stress inducing factors


No complaints of palpitations


Patient has had a headache which improved with improved blood pressure.





Patient only received 1 dose of IV hydralazine and is currently very well 

controlled with systolic blood pressure in the 120-109 mmHg range.  Currently 

maintained on home dose of lisinopril and hydralazine.  Admitting blood pressure

was 220/95


No complaints of chest pain or shortness of breath.  Patient has complained of 

increased weakness on and off for many years.  She has not followed up with an 

endocrinologist for many years now.  Dose of Cortef has not changed recently.








Review of Systems





As per HPI





Past Medical History


Past Medical History: Hypertension, Thyroid Disorder


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


History of Any Multi-Drug Resistant Organisms: None Reported


Additional Past Surgical History / Comment(s): dental implant  thyroid


Past Psychological History: No Psychological Hx Reported


Past Alcohol Use History: None Reported


Past Drug Use History: None Reported





Medications and Allergies


                                Home Medications











 Medication  Instructions  Recorded  Confirmed  Type


 


Hydrocortisone [Cortef] 10 mg PO BID 03/10/15 10/25/23 History


 


Levothyroxine Sodium [Synthroid] 75 mcg PO DAILY 03/18/17 10/25/23 History


 


lisinopriL [Prinivil] 20 mg PO BID 03/18/17 10/25/23 History


 


Rosuvastatin [Crestor] 20 mg PO HS 10/25/23 10/25/23 History


 


hydrALAZINE HCL [Apresoline] 25 mg PO QID 10/25/23 10/25/23 History








                                    Allergies











Allergy/AdvReac Type Severity Reaction Status Date / Time


 


adhesive Allergy  Rash/Hives Verified 10/25/23 19:25














Physical Exam


Vitals: 


                                   Vital Signs











  Temp Pulse Pulse Resp BP BP Pulse Ox


 


 10/26/23 07:00  98.0 F   68  18   177/87  95


 


 10/26/23 06:00   53 L    124/72  


 


 10/26/23 05:00   54 L    109/67  


 


 10/26/23 04:00   59 L   16  118/71  


 


 10/26/23 02:26   77   18  120/67   98


 


 10/26/23 01:15   68   18  133/65   98


 


 10/25/23 21:30   62   18  136/71   98


 


 10/25/23 21:00   64   18  151/75   99


 


 10/25/23 20:30   68   17  138/72   99


 


 10/25/23 20:00   67   18  140/72   98


 


 10/25/23 19:30   72   17  132/68   98


 


 10/25/23 19:00   69   18  154/72   99


 


 10/25/23 18:30   61   18  228/103   99


 


 10/25/23 18:00   62   18  205/88   99


 


 10/25/23 17:30   57 L   18  191/85   99


 


 10/25/23 16:42  98.3 F  60   15  220/95   97








                                Intake and Output











 10/25/23 10/26/23 10/26/23





 22:59 06:59 14:59


 


Other:   


 


  Weight 61.235 kg  














Agent is awake, comfortable, no acute distress


Alert oriented 3


Examination of the heart S1 and S2


Examination of the lungs bilateral breath sounds are heard


Abdomen is soft nontender


Examination of the lower extremities shows no evidence of edema


CNS exam grossly intact





Results





- Lab Results


                             Most recent lab results











Calcium  9.6 mg/dL (8.4-10.2)   10/26/23  10:22    














                                 10/26/23 10:22





                                 10/26/23 10:22





Assessment and Plan


Assessment: 





1.  Hypertensive urgency in a patient with previously well-controlled 

hypertension on one medication.  No obvious stress inducing factors identified. 

 No history of NSAIDs.  Potassium is low in spite of significant dose of ACE 

inhibitors.  Secondary causes of hypertension should be ruled out.  Given the 

patient's history of adrenal insufficiency, underlying adrenal gland adenoma is 

unlikely however due to the presence of hypokalemia and new onset of uncontrol

led hypertension I will proceed with workup.  Patient is also advised to follow-

up with endocrinology.  I will continue current dose of Cortef.  Since the blood

 pressure is quite adequately controlled with current regimen I will not add any

 new medications however patient is advised that if her blood pressure is 

uncontrolled post discharge she can start low-dose Aldactone.


CT angiogram to rule out renal artery stenosis will be ordered along with serum 

aldosterone and renin levels.  Of note is that TSH is low at 0.07 but T4 level 

is within range.  Consider decreasing dose of Synthroid versus follow-up with 

endocrinology as this could be be secondary hypothyroidism.


2.  History of adrenal insufficiency diagnosed about 45-50 years ago after 

pregnancy.


3.  Hypokalemia in a patient with uncontrolled hypertension and max dose of ACE 

inhibitor's, rule out hyperaldosteronism, primary versus secondary


4.  Hypothyroidism with low TSH and within range T4 levels.  Possible secondary 

hypothyroidism versus need for decreasing dose of Synthroid


Plan: 





Replace potassium


Check serum aldosterone and renin levels


Check CTA of the renal arteries rule out renal artery stenosis.


Consider decreasing dose of Synthroid as TSH is quite low unless this is 

central/ secondary hypothyroidism. Free T4 is not elevated


Continue current antihypertensive regimen


Patient is advised to follow-up with endocrinology post discharge.  Next





Thank you for the consultation.  We will continue to follow the patient with you

 during her hospitalization.

## 2023-10-26 NOTE — HP
HISTORY AND PHYSICAL



CHIEF COMPLAINT:

Hypertension, failure of outpatient treatment.



HISTORY OF PRESENT ILLNESS:

This is a 73-year-old woman with a past medical history of multiple medical problems

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

complaining of labile hypertension for the last several days.  The blood pressure is

even up to 300 and the patient was sent from Dr. Morales's office for control of blood

pressure.  There is no history of any fever, rigors, or chills.  The patient is

complaining of some head pressure.  Multiple consultants are following the patient

closely at this time.  The patient's potassium is 3.2.  The patient had an exophytic

cyst in the upper pole of left kidney.



PAST MEDICAL HISTORY:

Reviewed include [place]'s disease, rest of the history and rest of the chart is also

reviewed.



HOME MEDICATIONS:

Include lisinopril and rest of the dose and rest of medications reviewed.



ALLERGIES:

Adhesives.



FAMILY HISTORY:

No history of heart disease or strokes in the family.



SOCIAL HISTORY:

No history of smoking or alcohol.



REVIEW OF SYSTEMS:

A 14-point review is negative except as mentioned earlier.



PHYSICAL EXAMINATION:

VITAL SIGNS:  Pulse is 68, blood pressure 177/87, respirations 18.

HEENT:  Conjunctivae normal.

NECK:  No jugular venous distention.

CARDIOVASCULAR:  S1, S2 muffled.

RESPIRATIONS:  Diminished at the bases.  No rhonchi, no crackles.

ABDOMEN:  Soft, nontender.

LEGS:  No edema, no swelling.

NERVOUS SYSTEM:  No focal deficits.



LABORATORY DATA:

Noted.



ASSESSMENT:

1. Accelerated uncontrolled hypertension with failure of outpatient treatment.

2. Labile hypertension.

3. History of Dave's disease.

4. Hypokalemia.

5. Hypothyroidism.

6. History of dental implants.

7. Stable exophytic cyst upper pole of left kidney in the previous CAT scan.



RECOMMENDATIONS:

This 73-year-old woman presented with multiple complex medical issues, we will monitor

the patient closely, adjust the blood pressure medications and we will consult

Nephrology and Cardiology and continue to monitor.  I would also recommend CT of the

brain and also renal ultrasound to rule out possible renal artery stenosis.  Prognosis

guarded because of multiple complex medical conditions.  CT abdomen also will be

ordered to complete the workup.  Once again, Prognosis guarded.  Discussed with the

patient, she understands and agrees.





MMODL / IJN: [id number] / Job#: [id number]

## 2023-10-26 NOTE — CT
EXAMINATION TYPE: CT brain wo con

CT DLP: 1081.6 mGycm, Automated exposure control for dose reduction was used.

 

DATE OF EXAM: 10/26/2023 1:51 PM

 

COMPARISON: Prior CT Brain from 10/6/2018 .

 

CLINICAL INDICATION:Female, 73 years old with history of hypertension, htn

 

TECHNIQUE: 

Brain: Multiple axial CT images of the brain were obtained without IV contrast. Coronal and sagittal 
reformats reviewed.

 

FINDINGS:

 

Brain:

Extra-axial spaces: No abnormal extra-axial fluid collections.

Ventricular system: Within normal limits

Cerebral parenchyma: Mild cerebral volume loss. No acute intraparenchymal hemorrhage or mass effect. 
 The gray-white junction is well differentiated. Scattered hypoattenuating areas are seen within the 
periventricular white matter.  Nonspecific bilateral basal ganglia calcifications.

Cerebellum: Unremarkable.

Mass effect: No evidence of midline shift.

Intracranial vasculature: Atherosclerotic calcifications of the intracranial vessels.

Soft tissues: Normal.

Calvarium/osseous structures: No depressed skull fracture.

Paranasal sinuses and mastoid air cells: Clear

Visualized orbits: Bilateral aphakia

 

 

IMPRESSION:

1. No acute intracranial process.

2. Nonspecific white matter changes, likely secondary to chronic small vessel ischemic disease.

## 2023-10-26 NOTE — CT
EXAMINATION TYPE: CT ChestAbdPelvis wo con

CT DLP: 549.5 mGycm, Automated exposure control for dose reduction was used.

 

DATE OF EXAM: 10/26/2023 1:51 PM

 

COMPARISON: CT abdomen pelvis 11/16/2020

 

CLINICAL INDICATION:Female, 73 years old with history of hypertension; PHH, unknown reason for htn

 

Technique: Multiple axial images of the chest, abdomen, and pelvis were obtained without the administ
ration of intravenous contrast. Oral contrast was administered. Two-dimensional coronal and sagittal 
reconstructions were obtained. 

 

Findings: Evaluation is limited due to lack of intravenous contrast.

 

CHEST:

LUNGS/ PLEURA: The lung parenchyma appears unremarkable.  No suspicious pulmonary nodule or mass.

AIRWAY: Patent and unremarkable..

 

HEART: Size within normal limits. No pericardial effusion.

MEDIASTINUM: No evidence of adenopathy.

VASCULATURE:  No aortic aneurysm. Mild atherosclerotic calcification of the aorta and its branches.

 

MUSCULOSKELETAL: No acute osseous abnormalities.

SOFT TISSUES/LYMPH NODES: Dystrophic calcifications within the bilateral breasts.

LOWER NECK: No significant findings.

 

ABDOMEN:

ABDOMEN

LIVER: Unremarkable

GALLBLADDER AND BILE DUCTS: Unremarkable.

PANCREAS: Unremarkable.

SPLEEN: Unremarkable.

ADRENAL GLANDS: Unremarkable.

KIDNEYS AND URETERS: No evidence of hydronephrosis or renal calculus. Exophytic left superior pole 1.
5 cm lesion with Hounsfield unit of 48. This is stable from 2020

 

PELVIS

BLADDER: Unremarkable

REPRODUCTIVE: Unremarkable.

 

ABDOMEN & PELVIS

STOMACH AND BOWEL: Stomach and duodenum are unremarkable. No focal bowel wall thickening or surroundi
ng inflammatory changes. Enteric contrast reaches the distal small bowel. No evidence of bowel obstru
ction. 

PERITONEUM: No evidence of pneumoperitoneum or free fluid.

 

VASCULATURE: Mild atherosclerotic calcifications are present throughout the abdominal aorta and its b
ranches. 

MUSCULOSKELETAL: No acute osseous abnormalities

LYMPH NODES: No evidence for lymphadenopathy.

SOFT TISSUE/ABDOMINAL WALL: Unremarkable

 

IMPRESSION: 

 

1. No acute process within the chest, abdomen or pelvis. 

2. Stable 1.5 cm left renal lesion dating back to 2020 and consider benign likely representing a prot
einaceous/hemorrhagic cyst.

## 2023-10-27 LAB
ALBUMIN SERPL-MCNC: 3.7 D/DL (ref 3.8–4.9)
ALBUMIN/GLOB SERPL: 1.68 RATIO (ref 1.6–3.17)
ALP SERPL-CCNC: 61 U/L (ref 41–126)
ALT SERPL-CCNC: 11 U/L (ref 8–44)
ANION GAP SERPL CALC-SCNC: 8 MMOL/L (ref 4–12)
AST SERPL-CCNC: 19 U/L (ref 13–35)
BASOPHILS # BLD AUTO: 0.07 X 10*3/UL (ref 0–0.1)
BASOPHILS NFR BLD AUTO: 0.7 %
BUN SERPL-SCNC: 16.8 MG/DL (ref 9–27)
BUN/CREAT SERPL: 15.27 RATIO (ref 12–20)
CALCIUM SPEC-MCNC: 9.4 MG/DL (ref 8.7–10.3)
CHLORIDE SERPL-SCNC: 108 MMOL/L (ref 96–109)
CO2 SERPL-SCNC: 31 MMOL/L (ref 21.6–31.8)
EOSINOPHIL # BLD AUTO: 0.35 X 10*3/UL (ref 0.04–0.35)
EOSINOPHIL NFR BLD AUTO: 3.6 %
ERYTHROCYTE [DISTWIDTH] IN BLOOD BY AUTOMATED COUNT: 4.49 X 10*6/UL (ref 4.1–5.2)
ERYTHROCYTE [DISTWIDTH] IN BLOOD: 14.3 % (ref 11.5–14.5)
GLOBULIN SER CALC-MCNC: 2.2 D/DL (ref 1.6–3.3)
GLUCOSE SERPL-MCNC: 89 MG/DL (ref 70–110)
HCT VFR BLD AUTO: 39.7 % (ref 37.2–46.3)
HGB BLD-MCNC: 13 D/DL (ref 12–15)
HYALINE CASTS UR QL AUTO: 1 /LPF (ref 0–2)
IMM GRANULOCYTES BLD QL AUTO: 0.3 %
LYMPHOCYTES # SPEC AUTO: 1.67 X 10*3/UL (ref 0.9–5)
LYMPHOCYTES NFR SPEC AUTO: 17.3 %
MCH RBC QN AUTO: 29 PG (ref 27–32)
MCHC RBC AUTO-ENTMCNC: 32.7 D/DL (ref 32–37)
MCV RBC AUTO: 88.4 FL (ref 80–97)
MONOCYTES # BLD AUTO: 0.64 X 10*3/UL (ref 0.2–1)
MONOCYTES NFR BLD AUTO: 6.6 %
NEUTROPHILS # BLD AUTO: 6.9 X 10*3/UL (ref 1.8–7.7)
NEUTROPHILS NFR BLD AUTO: 71.5 %
NRBC BLD AUTO-RTO: 0 X 10*3/UL (ref 0–0.01)
PH UR: 6.5 [PH] (ref 5–8)
PLATELET # BLD AUTO: 194 X 10*3/UL (ref 140–440)
POTASSIUM SERPL-SCNC: 4.6 MMOL/L (ref 3.5–5.5)
PROT SERPL-MCNC: 5.9 D/DL (ref 6.2–8.2)
RBC UR QL: 4 /HPF (ref 0–5)
SODIUM SERPL-SCNC: 147 MMOL/L (ref 135–145)
SP GR UR: 1.01 (ref 1–1.03)
SQUAMOUS UR QL AUTO: 3 /HPF (ref 0–4)
UROBILINOGEN UR QL STRIP: <2 MG/DL (ref ?–2)
WBC # BLD AUTO: 9.66 X 10*3/UL (ref 4.5–10)
WBC #/AREA URNS HPF: 6 /HPF (ref 0–5)

## 2023-10-27 RX ADMIN — HYDROCORTISONE SCH MG: 10 TABLET ORAL at 09:38

## 2023-10-27 RX ADMIN — LEVOTHYROXINE SODIUM SCH MCG: 50 TABLET ORAL at 06:26

## 2023-10-27 RX ADMIN — ATORVASTATIN CALCIUM SCH MG: 40 TABLET, FILM COATED ORAL at 19:42

## 2023-10-27 RX ADMIN — SPIRONOLACTONE SCH MG: 25 TABLET, FILM COATED ORAL at 09:54

## 2023-10-27 NOTE — P.CRDCN
History of Present Illness


History of present illness: 





HISTORY OF PRESENT ILLNESS:  





This is a 73-year-old female with a past medical history significant for 

hypertension, hyperlipidemia, hypothyroidism, and Dave's disease. Patient 

does not follow with a cardiologist. We have been asked to see the patient in 

consultation for hypertension. Patient examined at the bedside.  Patient states 

her blood pressure at home has been running high.  She also reports having a 

headache for the past 2 days.  She went to see her primary care physician 

yesterday and was sent to the hospital for further evaluation.  Blood pressure 

on admission was 220/95.  The patient's home dose of hydrocortisone has been 

decreased by nephrology.  Patient's blood pressure has improved this morning 

with a systolic in the 150s.  Patient also reports having some palpitations.  

Telemetry reviewed with some self-limiting runs of atrial tachycardia.





* EKG reveals sinus mechanism with nonspecific ST-T wave changes.  Mild ST 

  depression in inferior leads


* Current home cardiac medications include lisinopril 20mg twice a day, 

  hydralazine 25 mg 4 times a day, rosuvastatin 20 mg at night





REVIEW OF SYSTEMS: 


At the time of my exam:


CONSTITUTIONAL: Denies fever or chills.


HEENT: Denies blurred vision, vision changes, or eye pain. Denies hemoptysis 


CARDIOVASCULAR: Denies chest pain. Denies orthopnea. Denies PND. Denies 

palpitations


RESPIRATORY: Denies shortness of breath. 


GASTROINTESTINAL: Denies abdominal pain. Denies nausea or vomiting. 


HEMATOLOGIC: Denies bleeding disorders.


GENITOURINARY:  Denies any blood in urine.


SKIN: Denies pruitis. Denies rash.





PHYSICAL EXAM: 


VITAL SIGNS: Reviewed.


GENERAL: Well-developed in no acute distress. 


HEENT: Head is normocephalic. Pupils are equal, round. Sclerae anicteric. Mucous

membranes of the mouth are moist. Neck supple. No JVD or thyromegaly


LUNGS: Respirations even and unlabored. Lungs essentially clear to auscultation 

bilaterally.


HEART: Regular rate and rhythm.  S1 and S2 heard.


ABDOMEN: Soft. Nondistended. Nontender.


EXTREMITIES: Normal range of motion.  No clubbing or cyanosis.  Peripheral 

pulses intact.  No lower extremity edema


NEUROLOGIC: Awake and alert. Oriented x 3. 





ASSESSMENT: 


Headache


Hypertensive urgency, improved


Runs of atrial tachycardia


History of Dave's disease


Hyperlipidemia


Hypothyroidism





PLAN: 


Patient's dose of hydrocortisone has been decreased per nephrology


Continue lisinopril


Discontinue hydralazine


Continue to monitor blood pressure and telemetry monitoring


Obtain 2-D echo to assess cardiac structure and function


Further recommendations pending patient's course








Nurse practitioner note has been reviewed by physician. Signing provider agrees 

with the documented findings, assessment, and plan of care. 








Past Medical History


Past Medical History: Hypertension, Thyroid Disorder


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


History of Any Multi-Drug Resistant Organisms: None Reported


Past Surgical History: No Surgical Hx Reported


Additional Past Surgical History / Comment(s): dental implant  thyroid


Past Psychological History: No Psychological Hx Reported


Past Alcohol Use History: None Reported


Past Drug Use History: None Reported





Medications and Allergies


                                Home Medications











 Medication  Instructions  Recorded  Confirmed  Type


 


Hydrocortisone [Cortef] 10 mg PO BID 03/10/15 10/25/23 History


 


Levothyroxine Sodium [Synthroid] 75 mcg PO DAILY 03/18/17 10/25/23 History


 


lisinopriL [Prinivil] 20 mg PO BID 03/18/17 10/25/23 History


 


Rosuvastatin [Crestor] 20 mg PO HS 10/25/23 10/25/23 History


 


hydrALAZINE HCL [Apresoline] 25 mg PO QID 10/25/23 10/25/23 History








                                    Allergies











Allergy/AdvReac Type Severity Reaction Status Date / Time


 


adhesive Allergy  Rash/Hives Verified 10/25/23 19:25














Physical Exam


Vitals: 


                                   Vital Signs











  Temp Pulse Resp BP BP BP BP


 


 10/27/23 09:04     156/92  171/99  162/84 


 


 10/27/23 08:00    18    


 


 10/27/23 07:00  98.3 F  64  18     156/83


 


 10/27/23 02:39  98.0 F  72  16     109/66


 


 10/26/23 22:03   88     


 


 10/26/23 20:00   88      151/86


 


 10/26/23 15:00  98.1 F  78  16     127/84














  Pulse Ox


 


 10/27/23 09:04 


 


 10/27/23 08:00 


 


 10/27/23 07:00  99


 


 10/27/23 02:39  96


 


 10/26/23 22:03 


 


 10/26/23 20:00 


 


 10/26/23 15:00  97








                                Intake and Output











 10/26/23 10/27/23 10/27/23





 22:59 06:59 14:59


 


Intake Total 240  35


 


Balance 240  35


 


Intake:   


 


  Oral 240  35


 


Other:   


 


  # Voids 2  














Results





                                 10/27/23 04:21





                                 10/27/23 04:21


                                 Cardiac Enzymes











  10/27/23 Range/Units





  04:21 


 


AST  19  (13-35)  U/L








                                       CBC











  10/27/23 Range/Units





  04:21 


 


WBC  9.66  (4.50-10.00)  X 10*3/uL


 


RBC  4.49  (4.10-5.20)  X 10*6/uL


 


Hgb  13.0  (12.0-15.0)  d/dL


 


Hct  39.7  (37.2-46.3)  %


 


Plt Count  194  (140-440)  X 10*3/uL








                          Comprehensive Metabolic Panel











  10/27/23 Range/Units





  04:21 


 


Sodium  147 H  (135-145)  mmol/L


 


Potassium  4.6  (3.5-5.5)  mmol/L


 


Chloride  108  ()  mmol/L


 


Carbon Dioxide  31.0  (21.6-31.8)  mmol/L


 


BUN  16.8  (9.0-27.0)  mg/dL


 


Creatinine  1.1  (0.6-1.5)  mg/dL


 


Glucose  89  ()  mg/dL


 


Calcium  9.4  (8.7-10.3)  mg/dL


 


AST  19  (13-35)  U/L


 


ALT  11  (8-44)  U/L


 


Alkaline Phosphatase  61  ()  U/L


 


Total Protein  5.9 L  (6.2-8.2)  d/dL


 


Albumin  3.7 L  (3.8-4.9)  d/dL








                               Current Medications











Generic Name Dose Route Start Last Admin





  Trade Name Freq  PRN Reason Stop Dose Admin


 


Acetaminophen  650 mg  10/25/23 21:16  10/26/23 09:23





  Acetaminophen Tab 325 Mg Tab  PO   650 mg





  Q6HR PRN   Administration





  Mild Pain or Fever > 100.5  


 


Atorvastatin Calcium  40 mg  10/25/23 21:30  10/26/23 22:03





  Atorvastatin 40 Mg Tab  PO   40 mg





  HS GLORIA   Administration


 


Hydrocortisone  10 mg  10/27/23 09:00  10/27/23 09:38





  Hydrocortisone 10 Mg Tab  PO   10 mg





  DAILY GLORIA   Administration


 


Hydrocortisone  5 mg  10/27/23 21:00 





  Hydrocortisone 10 Mg Tab  PO  





  HS GLORIA  


 


Dextrose/Water  1,000 mls @ 50 mls/hr  10/27/23 09:38  10/27/23 09:51





  Dextrose 5%-Water Iv Soln  IV  10/28/23 05:37  50 mls/hr





  .Q20H ONE   Administration


 


Levothyroxine Sodium  50 mcg  10/27/23 06:30  10/27/23 06:26





  Levothyroxine 50 Mcg Tab  PO   50 mcg





  DAILY@0630 GLORIA   Administration


 


Lisinopril  20 mg  10/26/23 09:00  10/27/23 09:41





  Lisinopril 20 Mg Tab  PO   20 mg





  BID GLORIA   Administration


 


Miscellaneous Information  1 each  10/26/23 07:15 





  Potassium Replacement Protocol 1 Each Misc  MISCELLANE  





  DAILY PRN  





  Per Protocol  





  Protocol  


 


Naloxone HCl  0.2 mg  10/25/23 21:02 





  Naloxone 0.4 Mg/Ml 1 Ml Vial  IV  





  Q2M PRN  





  Opioid Reversal  


 


Spironolactone  25 mg  10/27/23 09:45  10/27/23 09:54





  Spironolactone 25 Mg Tab  PO   25 mg





  DAILY GLORIA   Administration








                                Intake and Output











 10/26/23 10/27/23 10/27/23





 22:59 06:59 14:59


 


Intake Total 240  35


 


Balance 240  35


 


Intake:   


 


  Oral 240  35


 


Other:   


 


  # Voids 2  








                                        





                                 10/27/23 04:21 





                                 10/27/23 04:21

## 2023-10-27 NOTE — CT
INDICATION: 

Patient age:Female;  73 years old; 

Reason for study: r/o renal artery stenosis; [facility].

 

COMPARISON: CT chest abdomen pelvis 10/26/2023

 

TECHNIQUE: Multiple thin slice sub-millimeter images were obtained through the abdomen before and aft
er administration of contrast.  Patient was given Isovue 370, 75 cc intravenously.  3-D reconstructed
 images and maximum intensity projection images were obtained of the arterial vasculature of the abdo
men.

One or more CT dose reduction strategies were utilized during this examination. DLP administered was 
372.90 mGycm.

 

FINDINGS:

CTA Abdomen and pelvis: The abdominal aorta does not demonstrate aneurysmal dilatation.  Atherosclero
tic plaquing is identified within the abdominal aorta.  The origins of the superior mesenteric artery
, renal arteries, inferior mesenteric artery, and celiac axis are patent.  There are 2 right renal ar
teries and single left renal artery. 1No evidence for renal artery stenosis. 

 

VISCERA ABDOMEN:

Liver: Unremarkable. 

Gallbladder and Bile ducts: Unremarkable.

Pancreas: Unremarkable. 

Spleen: Unremarkable.

Adrenal glands: Unremarkable.

Kidneys and Ureters: No hydronephrosis or renal calculus. Stable 1.5 cm exophytic left superior pole 
lesion dating back to 2020 and considered benign. No enhancement identified. Likely proteinaceous/hem
orrhagic cyst.

 

Stomach and Bowel: No evidence of bowel obstruction or bowel wall thickening. Residual enteric contra
st is demonstrated within the colon.

Peritoneum:  No evidence of pneumoperitoneum, free fluid, or adenopathy.

Vasculature: Unremarkable. No aortic aneurysm.  

Musculoskeletal: The osseous structures appear intact. 

 

LOWER CHEST: No significant findings.

 

IMPRESSION:

No CTA evidence for renal artery stenosis.

## 2023-10-27 NOTE — P.PN
Subjective





This is a pleasant 73 years old female with multiple medical problems with 

headache and hypertension on 10/23


Currently patient feels better with no headache.  She denies abdominal pain or 

abdominal pain or discomfort.


Blood pressure is better controlled 124/72 and 138/83.  Patient currently kept 

on lisinopril 20 mg twice a daily.  And added Aldactone 25 mg 1 hydralazine was 

discontinued.


Dose of levothyroxine lower to 50 g and supportive floor to 10 mg a.m. and 5 mg

P.m intead of home dose of 10 mg twice a day.


Vision was instructed to follow up with endocrinologist as an outpatient





Objective





- Vital Signs


Vital signs: 


                                   Vital Signs











Temp  98.3 F   10/27/23 07:00


 


Pulse  64   10/27/23 07:00


 


Resp  18   10/27/23 08:00


 


BP  162/84   10/27/23 09:04


 


Pulse Ox  99   10/27/23 07:00


 


FiO2      








                                 Intake & Output











 10/26/23 10/27/23 10/27/23





 18:59 06:59 18:59


 


Intake Total 240  853


 


Balance 240  853


 


Intake:   


 


  Oral 240  853


 


Other:   


 


  # Voids 1 2 














- Exam





GENERAL: The patient is alert and oriented x3, not in any acute distress. Well 

developed, well nourished. 


HEENT: Pupils are round and equally reacting to light. EOMI. No scleral icterus.

No conjunctival pallor. Normocephalic, atraumatic. No pharyngeal erythema. No 

thyromegaly. 


CARDIOVASCULAR: S1 and S2 present. No murmurs, rubs, or gallops. 


PULMONARY: Chest is clear to auscultation, no wheezing , no crackles. 


ABDOMEN: Soft, nontender, nondistended, normoactive bowel sounds. No palpable 

organomegaly. 


MUSCULOSKELETAL: No joint swelling or deformity. 


EXTREMITIES: No cyanosis, clubbing, or pedal edema. 


NEUROLOGICAL: Gross neurological examination did not reveal any focal deficits. 


SKIN: No rashes. no petechiae.





- Labs


CBC & Chem 7: 


                                 10/27/23 04:21





                                 10/27/23 17:56


Labs: 


                  Abnormal Lab Results - Last 24 Hours (Table)











  10/26/23 10/27/23 10/27/23 Range/Units





  07:50 04:21 04:21 


 


MPV   13.7 H   (9.5-12.2)  FL


 


Sodium    147 H  (135-145)  mmol/L


 


Est GFR (CKD-EPI)    53 L  (>=60)   


 


Total Protein    5.9 L  (6.2-8.2)  d/dL


 


Albumin    3.7 L  (3.8-4.9)  d/dL


 


Urine Appearance  Cloudy H    (Clear)  


 


Urine WBC  6 H    (0-5)  /hpf


 


Urine Mucus  Rare H    (None)  /hpf














Assessment and Plan


Assessment: 





Hypertension with urgency present on admission


History of [place] disease and positive


Hypothyroidism


Mild EKG changes


Mild hypernatremia


Plan: 





Continue with D5W


Continue with lisinopril and Aldactone and monitor blood pressure


Continue with current dose of Cortef and levothyroxine


Cardiology and nephrology input is appreciated


Follow-up echocardiogram


Labs and medication were reviewed..  Continue same treatment.  Continue with 

symptomatic treatment.  Resume home medication.  Monitor labs and vitals.  DVT 

and GI prophylaxis.  Further recommendations as per clinical course of the 

patient


DVT prophylaxis: Subcutaneous heparin


GI Prophylaxis: Pepcid


Prognosis is guarded

## 2023-10-27 NOTE — P.PN
Subjective





Patient is seen in follow-up for hypertension.  Patient has a history of adrenal

insufficiency and is maintained on Cortef.  Blood pressure this morning was on 

the higher side.  Orthostatics negative.  No chest pain or shortness of breath.





Vital signs are stable.


General: No acute distress.


HEENT: Head exam is unremarkable. 


LUNGS: No audible rhonchi or wheezes.


HEART: Rate and Rhythm are regular. 


ABDOMEN: Nontender.


EXTREMITITES: No edema.





Objective





- Vital Signs


Vital signs: 


                                   Vital Signs











Temp  98.3 F   10/27/23 07:00


 


Pulse  64   10/27/23 07:00


 


Resp  18   10/27/23 07:00


 


BP  162/84   10/27/23 09:04


 


Pulse Ox  99   10/27/23 07:00


 


FiO2      








                                 Intake & Output











 10/26/23 10/27/23 10/27/23





 18:59 06:59 18:59


 


Intake Total 240  


 


Balance 240  


 


Intake:   


 


  Oral 240  


 


Other:   


 


  # Voids 1 2 














- Labs


CBC & Chem 7: 


                                 10/27/23 04:21





                                 10/27/23 04:21


Labs: 


                  Abnormal Lab Results - Last 24 Hours (Table)











  10/26/23 10/26/23 10/27/23 Range/Units





  07:50 10:22 04:21 


 


MPV    13.7 H  (9.5-12.2)  FL


 


Sodium     (135-145)  mmol/L


 


Potassium   3.4 L   (3.5-5.1)  mmol/L


 


Est GFR (CKD-EPI)     (>=60)   


 


Glucose   101 H   (74-99)  mg/dL


 


Total Protein     (6.2-8.2)  d/dL


 


Albumin     (3.8-4.9)  d/dL


 


Urine Appearance  Cloudy H    (Clear)  


 


Urine WBC  6 H    (0-5)  /hpf


 


Urine Mucus  Rare H    (None)  /hpf














  10/27/23 Range/Units





  04:21 


 


MPV   (9.5-12.2)  FL


 


Sodium  147 H  (135-145)  mmol/L


 


Potassium   (3.5-5.1)  mmol/L


 


Est GFR (CKD-EPI)  53 L  (>=60)   


 


Glucose   (74-99)  mg/dL


 


Total Protein  5.9 L  (6.2-8.2)  d/dL


 


Albumin  3.7 L  (3.8-4.9)  d/dL


 


Urine Appearance   (Clear)  


 


Urine WBC   (0-5)  /hpf


 


Urine Mucus   (None)  /hpf














Assessment and Plan


Plan: 





Assessment:


1.  Hypertensive urgency with hypokalemia concerning for underlying secondary 

causes.  However patient's also on Cortef which can cause renal potassium loss. 

Blood pressure Hivid negative orthostatics this morning.  TSH low, free T4 

normal.  Renal angiogram negative for renal artery stenosis.  No adrenal nodule 

noted.


2.  Adrenal insufficiency maintained on Cortef.


3.  Hypernatremia from lack of oral water intake.





Plan:


Add spironolactone.


Decrease dose of hydrocortisone to 10 mg in the morning and 5 mg at night.


Patient advised to see endocrinology outpatient.


Follow-up echocardiogram.


Follow-up renin and aldosterone levels as well as plasma metanephrines.


Add D5W at 50 mL an hour.  Repeat sodium level this evening.

## 2023-10-28 VITALS
SYSTOLIC BLOOD PRESSURE: 131 MMHG | TEMPERATURE: 98.2 F | HEART RATE: 83 BPM | RESPIRATION RATE: 18 BRPM | DIASTOLIC BLOOD PRESSURE: 71 MMHG

## 2023-10-28 LAB
ANION GAP SERPL CALC-SCNC: 10.8 MMOL/L (ref 4–12)
BUN SERPL-SCNC: 12.6 MG/DL (ref 9–27)
BUN/CREAT SERPL: 14 RATIO (ref 12–20)
CALCIUM SPEC-MCNC: 9.5 MG/DL (ref 8.7–10.3)
CHLORIDE SERPL-SCNC: 108 MMOL/L (ref 96–109)
CO2 SERPL-SCNC: 23.2 MMOL/L (ref 21.6–31.8)
GLUCOSE SERPL-MCNC: 73 MG/DL (ref 70–110)
MAGNESIUM SPEC-SCNC: 1.9 MG/DL (ref 1.5–2.4)
POTASSIUM SERPL-SCNC: 4.1 MMOL/L (ref 3.5–5.5)
SODIUM SERPL-SCNC: 142 MMOL/L (ref 135–145)

## 2023-10-28 RX ADMIN — SPIRONOLACTONE SCH MG: 25 TABLET, FILM COATED ORAL at 08:36

## 2023-10-28 RX ADMIN — ACETAMINOPHEN PRN MG: 325 TABLET, FILM COATED ORAL at 08:40

## 2023-10-28 RX ADMIN — HYDROCORTISONE SCH MG: 10 TABLET ORAL at 08:36

## 2023-10-28 RX ADMIN — LEVOTHYROXINE SODIUM SCH MCG: 50 TABLET ORAL at 05:33

## 2023-10-28 NOTE — P.PN
Subjective


Progress Note Date: 10/28/23


HISTORY OF PRESENT ILLNESS:  





This is a 73-year-old female with a past medical history significant for 

hypertension, hyperlipidemia, hypothyroidism, and Dave's disease. Patient 

does not follow with a cardiologist. We have been asked to see the patient in 

consultation for hypertension. Patient examined at the bedside.  Patient states 

her blood pressure at home has been running high.  She also reports having a 

headache for the past 2 days.  She went to see her primary care physician 

yesterday and was sent to the hospital for further evaluation.  Blood pressure 

on admission was 220/95.  The patient's home dose of hydrocortisone has been 

decreased by nephrology.  Patient's blood pressure has improved this morning 

with a systolic in the 150s.  Patient also reports having some palpitations.  

Telemetry reviewed with some self-limiting runs of atrial tachycardia.





* EKG reveals sinus mechanism with nonspecific ST-T wave changes.  Mild ST 

  depression in inferior leads


* Current home cardiac medications include lisinopril 20mg twice a day, 

  hydralazine 25 mg 4 times a day, rosuvastatin 20 mg at night





10/28/2023


CTA with no evidence of renal artery stenosis. ECHO with EF 55-60%, moderate 

LVH, mild tricuspid regurgitation. Creat 0.9. -160's. Orthostat VS 

negative. She reports feeling better, intermittent pounding in her head. Denies 

any chest pain or pressure. No shortness of breath. 








PHYSICAL EXAM: 


VITAL SIGNS: Reviewed.


GENERAL: Well-developed in no acute distress. 


HEENT: Head is normocephalic. 


LUNGS: Respirations even and unlabored. Lungs essentially clear to auscultation 

bilaterally.


HEART: Regular rate and rhythm.  S1 and S2 heard.


ABDOMEN: Soft. Nondistended. Nontender.


EXTREMITIES: Normal range of motion.  No clubbing or cyanosis.  Peripheral 

pulses intact.  No lower extremity edema


NEUROLOGIC: Awake and alert. Oriented x 3. 





ASSESSMENT: 


Headache


Hypertensive urgency, improved


Runs of atrial tachycardia


History of Dave's disease


Hyperlipidemia


Hypothyroidism


LVH





PLAN: 


Patient's dose of hydrocortisone has been decreased per nephrology.


Continue lisinopril and spironolactone.


Continue to monitor blood pressure, discussed limiting salt intake. 


Echo reviewed.


OK to discharge home from cardiology standpoint. Follow up in clinic in 1 week. 











Nurse practitioner note has been reviewed by physician. Signing provider agrees 

with the documented findings, assessment, and plan of care. 











Objective





- Vital Signs


Vital signs: 


                                   Vital Signs











Temp  98.3 F   10/28/23 07:52


 


Pulse  80   10/28/23 07:52


 


Resp  19   10/28/23 08:36


 


BP  142/79   10/28/23 07:52


 


Pulse Ox  97   10/28/23 07:52


 


FiO2      








                                 Intake & Output











 10/27/23 10/28/23 10/28/23





 18:59 06:59 18:59


 


Intake Total 1075  680


 


Balance 1075  680


 


Intake:   


 


  Oral 1075  680


 


Other:   


 


  Voiding Method   Toilet


 


  # Voids  2 














- Labs


CBC & Chem 7: 


                                 10/27/23 04:21





                                 10/28/23 05:41

## 2023-10-28 NOTE — CA
Transthoracic Echo Report 

 Name: Ntay Cosme 

 MRN:    [id number] 

 Age:    73     Gender:     F 

 

 :    1950 

 Exam Date:     10/27/2023 17:20 

 Exam Location: [facility] 

 Ht (in):     65     Wt (lb):     135 

 Ordering Physician:        Denilson Luu MD 

 Attending/Referring Phys:         [id number], Sheet 

 Technician         Morena Xiao RDCS 

 Procedure CPT: 

 Indications:       Rule out heart disease 

 

 Cardiac Hx: 

 Technical Quality:      Technically difficult study 

 Contrast 1:    Definity                    Total Dose (mL):      2 

 Contrast 2:                                Total Dose (mL): 

 

 MEASUREMENTS  (Male / Female) Normal Values 

 2D ECHO 

 LV Diastolic Diameter PLAX        3.1 cm                4.2 - 5.9 / 3.9 - 5.3 cm 

 LV Systolic Diameter PLAX         1.6 cm                 

 IVS Diastolic Thickness           1.4 cm                0.6 - 1.0 / 0.6 - 0.9 cm 

 LVPW Diastolic Thickness          1.3 cm                0.6 - 1.0 / 0.6 - 0.9 cm 

 LV Relative Wall Thickness        0.9                    

 RV Internal Dim ED PLAX           2.2 cm                 

 LA Volume                         20.3 cm???              18 - 58 / 22 - 52 cm??? 

 LA Volume Index                   12.1 cm???/m???           16 - 28 cm???/m??? 

 

 M-MODE 

 Aortic Root Diameter MM           3.1 cm                 

 LA Systolic Diameter MM           3.2 cm                 

 LA Ao Ratio MM                    1.0                    

 AV Cusp Separation MM             1.9 cm                 

 

 DOPPLER 

 AV Peak Velocity                  104.6 cm/s             

 AV Peak Gradient                  4.4 mmHg               

 AV Mean Velocity                  75.6 cm/s              

 AV Mean Gradient                  2.5 mmHg               

 AV Velocity Time Integral         21.0 cm                

 LVOT Peak Velocity                113.3 cm/s             

 LVOT Peak Gradient                5.1 mmHg               

 LVOT Velocity Time Integral       23.2 cm                

 MV Area PHT                       3.8 cm???                

 Mitral E Point Velocity           58.4 cm/s              

 Mitral A Point Velocity           99.1 cm/s              

 Mitral E to A Ratio               0.6                    

 MV Deceleration Time              201.6 ms               

 MV E' Velocity                    5.8 cm/s               

 Mitral E to MV E' Ratio           10.1                   

 TR Peak Velocity                  153.1 cm/s             

 TR Peak Gradient                  9.4 mmHg               

 Right Ventricular Systolic Press  13.6 mmHg              

 

 

 FINDINGS 

 Left Ventricle 

 Moderately increased left ventricular wall thickness. Left ventricular cavity  

 size normal. Normal left ventricular systolic function with no obvious regional  

 wall motion abnormalities. Left ventricular ejection fraction is estimated at  

 55-60 %. 

 

 Right Ventricle 

 Normal right ventricular size and function. Right ventricular systolic pressure  

 within normal limits. 

 

 Right Atrium 

 Normal right atrial size. 

 

 Left Atrium 

 Normal left atrial size. 

 

 Mitral Valve 

 Structurally normal mitral valve. Mild mitral annular calcification. Trace to  

 mild mitral regurgitation. 

 

 Aortic Valve 

 No aortic valve stenosis or regurgitation. 

 

 Tricuspid Valve 

 Structurally normal tricuspid valve. Mild tricuspid regurgitation. 

 

 Pulmonic Valve 

 Structurally normal pulmonic valve. 

 

 Pericardium 

 No pericardial effusion. 

 

 Aorta 

 Normal size aortic root and proximal ascending aorta. 

 

 CONCLUSIONS 

 Moderately increased left ventricular wall thickness 

 Left ventricular ejection fraction 55-60% 

 RVSP 13 

 Trace to mild mitral regurgitation 

 Mild tricuspid regurgitation 

 Previewed by:  

 Dr. Félix Ferrara DO 

 (Electronically Signed) 

 Final Date:      2023 11:57

## 2023-10-30 NOTE — P.DS
Providers


Date of admission: 


10/25/23 21:03





Attending physician: 


Idalia Agrawal





Consults: 





                                        





10/25/23 21:16


Consult Physician Urgent 


   Consulting Provider: Arpita Sorenson


   Consult Reason/Comments: accelerated htn, addisons disease


   Do you want consulting provider notified?: Yes





10/26/23 19:59


Consult Physician Urgent 


   Consulting Provider: Félix Ferrara


   Consult Reason/Comments: st depression on ekg


   Do you want consulting provider notified?: Yes, Notify in am











Primary care physician: 


[facility] Course: 





Diagnoses:


Hypertension with urgency present on admission. improved upon discharge 


History of Dave disease and positive


Hypothyroidism


Mild EKG changes


Mild hypernatremia








Hospital course:


This is a pleasant 73 years old female with multiple medical problems with 

headache and hypertension on 10/23


Currently patient feels better with no headache.  She denies abdominal pain or 

abdominal pain or discomfort.  No chest pain no dyspnea.


Blood pressure which was elevated on admission at 220/95, now is better 

controlled 131/71 upon discharge.  Patient currently kept on lisinopril 20 mg 

twice a daily.  And added Aldactone while 25 mg of  hydralazine was 

discontinued.


Dose of levothyroxine is lowered to 50 g (because pt had low TSH :0.077 )and 

cortef to 10 mg a.m. and 5 mg P.m instead of home dose of 10 mg twice a day (per

nephrologist) .this also helped control the bp 


Vision was instructed to follow up with endocrinologist as an outpatient and pt 

agrees


Patient also evaluated by cardiologist, ejection fraction 55-60%.


On the day of discharge patient feels fine.  She denies chest pain dyspnea.  No 

change in urine or bowel habits.  No fever.  No new complaints and wants to go 

home.


Patient was cleared for discharge by both cardiologist and nephrologist


Problems and management plan were discussed with the patient and he verbalized 

understanding and acceptance


Patient was found stable and can be discharged home in guarded prognosis however

he needs follow-up as an outpatient. Patient was instructed to follow up with 

PCP Dr. Austin within one week and patient agrees


Patient was instructed to follow up with cardiologist Dr. Ferrara in 1-2 weeks,

nephrologist Dr. Briones in one week and endocrinologist Dr. Escobar in 1-2 weeks 

and she agrees to call and make her appointment





Physical exam


Gen: patient is a AAOx3, no distress


CVS: S1-S2, RRR, no murmur


Lungs: B/L CTA, no wheezing


Abdomen: soft, no distention, no tenderness, positive bowel sounds


Extremity: no leg edema or induration





Time spent more than 35 minutes








Patient Condition at Discharge: Stable





Plan - Discharge Summary


Discharge Rx Participant: No


New Discharge Prescriptions: 


New


   Hydrocortisone [Cortef] 10 mg PO DAILY #30 tab


   Spironolactone [Aldactone] 25 mg PO DAILY #30 tab


   Hydrocortisone [Cortef] 5 mg PO HS #30 tab


   Levothyroxine Sodium [Synthroid] 50 mcg PO DAILY@0630 #30 tab





Continue


   lisinopriL [Prinivil] 20 mg PO BID


   Rosuvastatin [Crestor] 20 mg PO HS





Discontinued


   Hydrocortisone [Cortef] 10 mg PO BID


   Levothyroxine Sodium [Synthroid] 75 mcg PO DAILY


   hydrALAZINE HCL [Apresoline] 25 mg PO QID


Discharge Medication List





lisinopriL [Prinivil] 20 mg PO BID 03/18/17 [History]


Rosuvastatin [Crestor] 20 mg PO HS 10/25/23 [History]


Hydrocortisone [Cortef] 5 mg PO HS #30 tab 10/28/23 [Rx]


Hydrocortisone [Cortef] 10 mg PO DAILY #30 tab 10/28/23 [Rx]


Levothyroxine Sodium [Synthroid] 50 mcg PO DAILY@0630 #30 tab 10/28/23 [Rx]


Spironolactone [Aldactone] 25 mg PO DAILY #30 tab 10/28/23 [Rx]








Follow up Appointment(s)/Referral(s): 


Nestor Morales MD [Primary Care Provider] - 1-2 days


Félix Ferrara DO [STAFF PHYSICIAN] - 2 Weeks (heart doctor )


Juvenal Briones DO [STAFF PHYSICIAN] - 1 Week (kidney doctor for your high blood

pressure )


Leobardo Escobar MD [REFERRING] - 2 Weeks (endocrinologist, hormone doctor)


Patient Instructions/Handouts:  Rincon Disease (DC), Hypertension (DC)


Activity/Diet/Wound Care/Special Instructions: 


Heart healthy diet





Activity is restricted till you see your doctor





Check blood pressure 3 times a day and keep diary.





follow up as directed, sooner if worsening symptoms or concerns or return to ER





Discharge Disposition: HOME SELF-CARE

## 2023-11-02 LAB
METANEPH FREE SERPL-MCNC: <25 PG/ML
METANEPHS SERPL-MCNC: 105 PG/ML
NORMETANEPH FREE SERPL-MCNC: 105 PG/ML

## 2024-11-14 ENCOUNTER — HOSPITAL ENCOUNTER (OUTPATIENT)
Dept: HOSPITAL 47 - RADMAMWWP | Age: 74
Discharge: HOME | End: 2024-11-14
Attending: INTERNAL MEDICINE
Payer: MEDICARE

## 2024-11-14 DIAGNOSIS — R92.333: ICD-10-CM

## 2024-11-14 DIAGNOSIS — Z12.31: Primary | ICD-10-CM

## 2024-11-14 DIAGNOSIS — Z80.3: ICD-10-CM

## 2024-11-14 DIAGNOSIS — Z78.0: ICD-10-CM

## 2024-11-14 PROCEDURE — 77063 BREAST TOMOSYNTHESIS BI: CPT

## 2024-11-14 PROCEDURE — 77067 SCR MAMMO BI INCL CAD: CPT

## 2025-05-09 ENCOUNTER — HOSPITAL ENCOUNTER (OUTPATIENT)
Dept: HOSPITAL 47 - RADUSWWP | Age: 75
Discharge: HOME | End: 2025-05-09
Attending: INTERNAL MEDICINE
Payer: MEDICARE

## 2025-05-09 DIAGNOSIS — R94.4: Primary | ICD-10-CM

## 2025-05-09 PROCEDURE — 76770 US EXAM ABDO BACK WALL COMP: CPT
